# Patient Record
Sex: FEMALE | Race: WHITE | Employment: FULL TIME | URBAN - METROPOLITAN AREA
[De-identification: names, ages, dates, MRNs, and addresses within clinical notes are randomized per-mention and may not be internally consistent; named-entity substitution may affect disease eponyms.]

---

## 2020-12-29 ENCOUNTER — TRANSCRIBE ORDER (OUTPATIENT)
Dept: SCHEDULING | Age: 35
End: 2020-12-29

## 2020-12-29 DIAGNOSIS — R51.9 NONINTRACTABLE HEADACHE: ICD-10-CM

## 2020-12-29 DIAGNOSIS — R20.8 DYSESTHESIA: Primary | ICD-10-CM

## 2021-01-20 ENCOUNTER — HOSPITAL ENCOUNTER (OUTPATIENT)
Dept: MRI IMAGING | Age: 36
Discharge: HOME | End: 2021-01-20
Attending: FAMILY MEDICINE
Payer: COMMERCIAL

## 2021-01-20 DIAGNOSIS — R20.8 DYSESTHESIA: ICD-10-CM

## 2021-01-20 DIAGNOSIS — R51.9 NONINTRACTABLE HEADACHE: ICD-10-CM

## 2021-01-20 PROCEDURE — 70551 MRI BRAIN STEM W/O DYE: CPT

## 2022-11-12 ENCOUNTER — HOSPITAL ENCOUNTER (EMERGENCY)
Age: 37
Discharge: HOME | End: 2022-11-12
Attending: EMERGENCY MEDICINE
Payer: COMMERCIAL

## 2022-11-12 VITALS
OXYGEN SATURATION: 99 % | HEART RATE: 78 BPM | BODY MASS INDEX: 24.24 KG/M2 | RESPIRATION RATE: 16 BRPM | WEIGHT: 145.5 LBS | TEMPERATURE: 98.3 F | DIASTOLIC BLOOD PRESSURE: 71 MMHG | HEIGHT: 65 IN | SYSTOLIC BLOOD PRESSURE: 118 MMHG

## 2022-11-12 DIAGNOSIS — L03.113 CELLULITIS OF RIGHT UPPER EXTREMITY: ICD-10-CM

## 2022-11-12 DIAGNOSIS — I80.8 THROMBOPHLEBITIS ARM: Primary | ICD-10-CM

## 2022-11-12 PROCEDURE — 99283 EMERGENCY DEPT VISIT LOW MDM: CPT

## 2022-11-12 RX ORDER — CLINDAMYCIN HYDROCHLORIDE 300 MG/1
300 CAPSULE ORAL 3 TIMES DAILY
Qty: 21 CAPSULE | Refills: 0 | Status: SHIPPED | OUTPATIENT
Start: 2022-11-12 | End: 2022-11-19

## 2022-11-12 RX ORDER — CEPHALEXIN 500 MG/1
500 CAPSULE ORAL 3 TIMES DAILY
COMMUNITY
End: 2022-11-21

## 2022-11-12 RX ORDER — IBUPROFEN 800 MG/1
800 TABLET ORAL
COMMUNITY
End: 2022-11-30

## 2022-11-12 RX ORDER — MUPIROCIN 20 MG/G
OINTMENT TOPICAL DAILY
COMMUNITY

## 2022-11-12 NOTE — ED NOTES
The patient was discharged home by Dr Samuel Benítez in stable condition. The patient is alert and oriented, in no respiratory distress. The patient's diagnosis, condition and treatment were explained. The patient expressed understanding. 1 prescriptions given. A discharge plan has been developed. A  was not involved in the process. Aftercare instructions were given. Pt ambulatory out of the ED.

## 2022-11-12 NOTE — ED TRIAGE NOTES
Pt presents to ED with c/o skin infection on right mid forearm. Pt states she had an IV inserted for birth of her child on 10/26/22. Pt states removed 10/28/22 and has been painful since. Pt seen by her MD 11/9/2022 and started on Keflex. She states no better.

## 2022-11-16 NOTE — ED PROVIDER NOTES
27-year-old female with no significant past medical history presents to the emergency department stating that she had an IV inserted and her right forearm for the birth of her child on 10/26. She states that this IV was in place until 10/28 but states that during that entire time it was very painful particularly when medications or fluids were passed through it. She states that she requested that the IV be removed multiple times but it remained in place. She states that ever since it has been removed it was red and she states that she was started on Keflex by her primary care doctor on 11/9 which she has been taking for a few days without improvement in her symptoms. She states that she has noted a very small amount of white stuff around the wound but no samantha purulent drainage. She denies any fever, chills, or other systemic symptoms. She expresses concern for possible MRSA. Denies any distal numbness or weakness or significant swelling. Skin Problem        Past Medical History:   Diagnosis Date    Gastrointestinal disorder        Past Surgical History:   Procedure Laterality Date    HX HEENT           History reviewed. No pertinent family history.     Social History     Socioeconomic History    Marital status: SINGLE     Spouse name: Not on file    Number of children: Not on file    Years of education: Not on file    Highest education level: Not on file   Occupational History    Not on file   Tobacco Use    Smoking status: Former     Types: Cigarettes     Passive exposure: Never    Smokeless tobacco: Never   Substance and Sexual Activity    Alcohol use: Yes     Comment: occasional    Drug use: Not on file    Sexual activity: Not on file   Other Topics Concern    Not on file   Social History Narrative    Not on file     Social Determinants of Health     Financial Resource Strain: Not on file   Food Insecurity: Not on file   Transportation Needs: Not on file   Physical Activity: Not on file   Stress: Not on file   Social Connections: Not on file   Intimate Partner Violence: Not on file   Housing Stability: Not on file         ALLERGIES: Shellfish derived    Review of Systems   Constitutional:  Negative for activity change, appetite change, chills and fever. HENT:  Negative for congestion, rhinorrhea, sinus pressure, sneezing and sore throat. Eyes:  Negative for photophobia and visual disturbance. Respiratory:  Negative for cough and shortness of breath. Cardiovascular:  Negative for chest pain. Gastrointestinal:  Negative for abdominal pain, blood in stool, constipation, diarrhea, nausea and vomiting. Genitourinary:  Negative for difficulty urinating, dysuria, flank pain, frequency, hematuria, menstrual problem, urgency, vaginal bleeding and vaginal discharge. Musculoskeletal:  Negative for arthralgias, back pain, myalgias and neck pain. Skin:  Positive for rash and wound. Neurological:  Negative for syncope, weakness, numbness and headaches. Psychiatric/Behavioral:  Negative for self-injury and suicidal ideas. All other systems reviewed and are negative. Vitals:    11/12/22 0855   BP: 118/71   Pulse: 78   Resp: 16   Temp: 98.3 °F (36.8 °C)   SpO2: 99%   Weight: 66 kg (145 lb 8.1 oz)   Height: 5' 5\" (1.651 m)            Physical Exam  Vitals and nursing note reviewed. Constitutional:       General: She is not in acute distress. Appearance: Normal appearance. She is well-developed. She is not diaphoretic. Comments: Very pleasant, no acute distress. HENT:      Head: Normocephalic and atraumatic. Nose: Nose normal.   Eyes:      Extraocular Movements: Extraocular movements intact. Conjunctiva/sclera: Conjunctivae normal.      Pupils: Pupils are equal, round, and reactive to light. Cardiovascular:      Rate and Rhythm: Normal rate and regular rhythm. Heart sounds: Normal heart sounds.    Pulmonary:      Effort: Pulmonary effort is normal.      Breath sounds: Normal breath sounds. Abdominal:      General: There is no distension. Palpations: Abdomen is soft. Tenderness: There is no abdominal tenderness. Musculoskeletal:         General: No tenderness. Cervical back: Neck supple. Skin:     General: Skin is warm and dry. Comments: See attached clinical photo with circular erythematous area surrounding IV site suspected of thrombophlebitis and cellulitis. No fluctuant abscess. 2+ radial pulses distal with intact strength and sensation. Neurological:      General: No focal deficit present. Mental Status: She is alert and oriented to person, place, and time. Cranial Nerves: No cranial nerve deficit. Sensory: No sensory deficit. Motor: No weakness. Coordination: Coordination normal.            MDM    76-year-old female presents with cellulitis and thrombophlebitis after IV. She is afebrile with vital signs stable no acute distress. We will switch antibiotics to doxycycline for better MRSA coverage and recommended frequent warm compresses to the area allowing it to heal.  Recommended PCP follow-up for further evaluation and return precautions were given for worsening or concerns. This plan was discussed with the patient at the bedside and she stated both understanding and agreement. Please note that this dictation was completed with [employer], the [employer] voice recognition software. Quite often unanticipated grammatical, syntax, homophones, and other interpretive errors are inadvertently transcribed by the computer software. Please disregard these errors. Please excuse any errors that have escaped final proofreading.       Procedures

## 2022-11-21 ENCOUNTER — HOSPITAL ENCOUNTER (EMERGENCY)
Age: 37
Discharge: HOME | End: 2022-11-21
Attending: EMERGENCY MEDICINE
Payer: COMMERCIAL

## 2022-11-21 ENCOUNTER — HOSPITAL ENCOUNTER (OUTPATIENT)
Dept: INFUSION THERAPY | Age: 37
Discharge: HOME | End: 2022-11-21
Payer: COMMERCIAL

## 2022-11-21 ENCOUNTER — OFFICE VISIT (OUTPATIENT)
Dept: EMERGENCY DEPT | Age: 37
End: 2022-11-21
Attending: EMERGENCY MEDICINE
Payer: COMMERCIAL

## 2022-11-21 VITALS
SYSTOLIC BLOOD PRESSURE: 116 MMHG | BODY MASS INDEX: 23.99 KG/M2 | TEMPERATURE: 98.2 F | WEIGHT: 144 LBS | RESPIRATION RATE: 16 BRPM | HEIGHT: 65 IN | OXYGEN SATURATION: 96 % | DIASTOLIC BLOOD PRESSURE: 67 MMHG | HEART RATE: 93 BPM

## 2022-11-21 VITALS
BODY MASS INDEX: 24.62 KG/M2 | SYSTOLIC BLOOD PRESSURE: 111 MMHG | HEIGHT: 65 IN | TEMPERATURE: 98.4 F | HEART RATE: 100 BPM | OXYGEN SATURATION: 97 % | RESPIRATION RATE: 18 BRPM | WEIGHT: 147.8 LBS | DIASTOLIC BLOOD PRESSURE: 59 MMHG

## 2022-11-21 DIAGNOSIS — Z78.9 FAILURE OF OUTPATIENT TREATMENT: ICD-10-CM

## 2022-11-21 DIAGNOSIS — I80.8 SEPTIC THROMBOPHLEBITIS OF UPPER EXTREMITY: Primary | ICD-10-CM

## 2022-11-21 PROCEDURE — 96365 THER/PROPH/DIAG IV INF INIT: CPT

## 2022-11-21 PROCEDURE — 99284 EMERGENCY DEPT VISIT MOD MDM: CPT

## 2022-11-21 PROCEDURE — 74011000258 HC RX REV CODE- 258: Performed by: EMERGENCY MEDICINE

## 2022-11-21 PROCEDURE — 74011250636 HC RX REV CODE- 250/636: Performed by: EMERGENCY MEDICINE

## 2022-11-21 PROCEDURE — 99284 EMERGENCY DEPT VISIT MOD MDM: CPT | Performed by: EMERGENCY MEDICINE

## 2022-11-21 RX ADMIN — DALBAVANCIN 1500 MG: 500 INJECTION, POWDER, FOR SOLUTION INTRAVENOUS at 16:11

## 2022-11-21 NOTE — PROGRESS NOTES
[facility] Progress Note    Date: 2022    Name: Saintclair Meter    MRN: [id number]         : 1985    Ms. Will Arrived ambulatory and in no distress for Dalvance Infusion. Assessment was completed, no acute issues at this time, no new complaints voiced. 24 G PIV established to left arm, + blood return. Ms. Oz Walton vitals were reviewed. Visit Vitals  BP (!) 111/59 (BP 1 Location: Right upper arm, BP Patient Position: Sitting)   Pulse 100   Temp 98.4 °F (36.9 °C)   Resp 18   Ht 5' 5\" (1.651 m)   Wt 67 kg (147 lb 12.8 oz)   SpO2 97%   Breastfeeding Yes   BMI 24.60 kg/m²         Medications:  Medications Administered       dalbavancin (DALVANCE) 1,500 mg in dextrose 5% 250 mL, overfill volume 25 mL IVPB       Admin Date  2022 Action  New Bag Dose  1,500 mg Rate  700 mL/hr Route  IntraVENous Administered By  Melisa Nassar RN                      Ms. [facility] tolerated treatment well and was discharged from [place] [id number] in stable condition at 1650. PIV flushed & removed. Pt was advised to follow up with her physician regarding future appointments at the [facility].     Betsy Alvarez RN  2022

## 2022-11-21 NOTE — ED PROVIDER NOTES
The history is provided by the patient. Skin Problem   This is a recurrent problem. The current episode started more than 1 week ago. The problem has not changed since onset. Associated with: IV placed during L&D. There has been no fever. Affected Location: right forearm. The pain is mild. The pain has been Constant since onset. Associated symptoms include pain and weeping. Treatments tried: clindamycin, mupirocin. The treatment provided no relief. Past Medical History:   Diagnosis Date    Gastrointestinal disorder        Past Surgical History:   Procedure Laterality Date    HX HEENT           History reviewed. No pertinent family history. Social History     Socioeconomic History    Marital status: SINGLE     Spouse name: Not on file    Number of children: Not on file    Years of education: Not on file    Highest education level: Not on file   Occupational History    Not on file   Tobacco Use    Smoking status: Former     Types: Cigarettes     Passive exposure: Never    Smokeless tobacco: Never   Substance and Sexual Activity    Alcohol use: Yes     Comment: occasional    Drug use: Not on file    Sexual activity: Not on file   Other Topics Concern    Not on file   Social History Narrative    Not on file     Social Determinants of Health     Financial Resource Strain: Not on file   Food Insecurity: Not on file   Transportation Needs: Not on file   Physical Activity: Not on file   Stress: Not on file   Social Connections: Not on file   Intimate Partner Violence: Not on file   Housing Stability: Not on file         ALLERGIES: Shellfish derived    Review of Systems   All other systems reviewed and are negative. Vitals:    11/21/22 1050 11/21/22 1051   BP:  116/67   Pulse:  93   Resp:  16   Temp:  98.2 °F (36.8 °C)   SpO2:  94%   Weight: 65.3 kg (144 lb)    Height: 5' 5\" (1.651 m)             Physical Exam  Vitals and nursing note reviewed. Constitutional:       General: She is not in acute distress. Appearance: She is well-developed. HENT:      Head: Normocephalic and atraumatic. Eyes:      Conjunctiva/sclera: Conjunctivae normal.   Cardiovascular:      Rate and Rhythm: Normal rate and regular rhythm. Pulmonary:      Effort: Pulmonary effort is normal. No respiratory distress. Abdominal:      General: There is no distension. Musculoskeletal:         General: No deformity. Normal range of motion. Cervical back: Neck supple. Skin:     General: Skin is warm and dry. Comments: Right volar forearm with central ulceration/eschar and induration with surrounding erythema and warmth. 2 cm in diameter. Neurological:      Mental Status: She is alert. Cranial Nerves: No cranial nerve deficit. Psychiatric:         Behavior: Behavior normal.        MDM       40 y.o. female presents with ongoing infection of right volar forearm. Appears to be a septic thrombophlebitis from IV site and has not been responsive to topical or oral ABx as an outpatient. No systemic symptoms. Confirmed diagnosis by bedside ultrasound imaging. Discussed options for escalating therapy and she is a good candidate for dalvance infusion to avoid hospitalization. Set up for infusion later today. Will continue basic wound care. Plan to follow up with PCP as needed and return precautions discussed for worsening or new concerning symptoms.      Procedures

## 2022-11-21 NOTE — ED TRIAGE NOTES
Pt c/o right arm pain X 3 weeks, seen for the same here and PCP office, pt has been on clindamycin and cephalexin without improvement; pt had an IV at the site of the pain 3 weeks ago and states \" I told the nurses it was hurting and burning and all they did was put ice on it and continued to use it. \" PT has an area about the size of a dime with red around the site, black area in the midde of the site. Pt reprots fevers temp max 100.5 Saturday; denied n/v. PT reports taking tylenol for pain pain 9/10 at this time; pt is currently breastfeeding her .

## 2022-11-21 NOTE — PROGRESS NOTES
CARE MANAGEMENT NOTE      CM received consult for Mel. CM spoke with Prema Bush at [facility]. Appt scheduled for 3pm today.  Appt added to AVS. Rx scanned into chart.     _____________________________________  Deena Morrissey [address] [facility] Management   Available via [employer]  11/21/2022   11:51 AM

## 2022-11-27 ENCOUNTER — APPOINTMENT (OUTPATIENT)
Dept: GENERAL RADIOLOGY | Age: 37
DRG: 776 | End: 2022-11-27
Attending: EMERGENCY MEDICINE
Payer: COMMERCIAL

## 2022-11-27 ENCOUNTER — APPOINTMENT (OUTPATIENT)
Dept: CT IMAGING | Age: 37
DRG: 776 | End: 2022-11-27
Attending: EMERGENCY MEDICINE
Payer: COMMERCIAL

## 2022-11-27 ENCOUNTER — HOSPITAL ENCOUNTER (INPATIENT)
Age: 37
LOS: 3 days | Discharge: HOME | DRG: 776 | End: 2022-11-30
Attending: EMERGENCY MEDICINE | Admitting: INTERNAL MEDICINE
Payer: COMMERCIAL

## 2022-11-27 DIAGNOSIS — D72.825 BANDEMIA: ICD-10-CM

## 2022-11-27 DIAGNOSIS — D72.10 EOSINOPHILIA, UNSPECIFIED TYPE: ICD-10-CM

## 2022-11-27 DIAGNOSIS — I80.9 SEPTIC THROMBOPHLEBITIS: ICD-10-CM

## 2022-11-27 DIAGNOSIS — R50.9 FEVER OF UNKNOWN ORIGIN: Primary | ICD-10-CM

## 2022-11-27 LAB
ALBUMIN SERPL-MCNC: 2.8 G/DL (ref 3.5–5)
ALBUMIN/GLOB SERPL: 0.7 (ref 1.1–2.2)
ALP SERPL-CCNC: 65 U/L (ref 45–117)
ALT SERPL-CCNC: 21 U/L (ref 12–78)
ANION GAP SERPL CALC-SCNC: 9 MMOL/L (ref 5–15)
APPEARANCE UR: ABNORMAL
AST SERPL-CCNC: 21 U/L (ref 15–37)
BACTERIA URNS QL MICRO: NEGATIVE /HPF
BASOPHILS # BLD: 0.1 K/UL (ref 0–0.1)
BASOPHILS NFR BLD: 1 % (ref 0–1)
BILIRUB SERPL-MCNC: 0.4 MG/DL (ref 0.2–1)
BILIRUB UR QL: NEGATIVE
BUN SERPL-MCNC: 5 MG/DL (ref 6–20)
BUN/CREAT SERPL: 7 (ref 12–20)
CALCIUM SERPL-MCNC: 8.1 MG/DL (ref 8.5–10.1)
CHLORIDE SERPL-SCNC: 104 MMOL/L (ref 97–108)
CO2 SERPL-SCNC: 28 MMOL/L (ref 21–32)
COLOR UR: ABNORMAL
COVID-19 RAPID TEST, COVR: NOT DETECTED
CREAT SERPL-MCNC: 0.71 MG/DL (ref 0.55–1.02)
D DIMER PPP FEU-MCNC: 12.16 MG/L FEU (ref 0–0.65)
DIFFERENTIAL METHOD BLD: ABNORMAL
EOSINOPHIL # BLD: 1.2 K/UL (ref 0–0.4)
EOSINOPHIL NFR BLD: 11 % (ref 0–7)
EPITH CASTS URNS QL MICRO: ABNORMAL /LPF
ERYTHROCYTE [DISTWIDTH] IN BLOOD BY AUTOMATED COUNT: 13.8 % (ref 11.5–14.5)
FLUAV AG NPH QL IA: NEGATIVE
FLUBV AG NOSE QL IA: NEGATIVE
GLOBULIN SER CALC-MCNC: 4.1 G/DL (ref 2–4)
GLUCOSE SERPL-MCNC: 101 MG/DL (ref 65–100)
GLUCOSE UR STRIP.AUTO-MCNC: NEGATIVE MG/DL
HCT VFR BLD AUTO: 35 % (ref 35–47)
HGB BLD-MCNC: 11.6 G/DL (ref 11.5–16)
HGB UR QL STRIP: ABNORMAL
IMM GRANULOCYTES # BLD AUTO: 0 K/UL (ref 0–0.04)
IMM GRANULOCYTES NFR BLD AUTO: 0 % (ref 0–0.5)
KETONES UR QL STRIP.AUTO: NEGATIVE MG/DL
LACTATE BLD-SCNC: 0.85 MMOL/L (ref 0.4–2)
LEUKOCYTE ESTERASE UR QL STRIP.AUTO: ABNORMAL
LIPASE SERPL-CCNC: 65 U/L (ref 73–393)
LYMPHOCYTES # BLD: 2 K/UL (ref 0.8–3.5)
LYMPHOCYTES NFR BLD: 18 % (ref 12–49)
MAGNESIUM SERPL-MCNC: 1.6 MG/DL (ref 1.6–2.4)
MCH RBC QN AUTO: 31.9 PG (ref 26–34)
MCHC RBC AUTO-ENTMCNC: 33.1 G/DL (ref 30–36.5)
MCV RBC AUTO: 96.2 FL (ref 80–99)
MONOCYTES # BLD: 1.4 K/UL (ref 0–1)
MONOCYTES NFR BLD: 13 % (ref 5–13)
NEUTS SEG # BLD: 6.1 K/UL (ref 1.8–8)
NEUTS SEG NFR BLD: 56 % (ref 32–75)
NITRITE UR QL STRIP.AUTO: NEGATIVE
NRBC # BLD: 0 K/UL (ref 0–0.01)
NRBC BLD-RTO: 0 PER 100 WBC
PH UR STRIP: 6 (ref 5–8)
PLATELET # BLD AUTO: 398 K/UL (ref 150–400)
PMV BLD AUTO: 8.4 FL (ref 8.9–12.9)
POTASSIUM SERPL-SCNC: 3.5 MMOL/L (ref 3.5–5.1)
PROT SERPL-MCNC: 6.9 G/DL (ref 6.4–8.2)
PROT UR STRIP-MCNC: NEGATIVE MG/DL
RBC # BLD AUTO: 3.64 M/UL (ref 3.8–5.2)
RBC #/AREA URNS HPF: ABNORMAL /HPF (ref 0–5)
RBC MORPH BLD: ABNORMAL
SODIUM SERPL-SCNC: 141 MMOL/L (ref 136–145)
SOURCE, COVRS: NORMAL
SP GR UR REFRACTOMETRY: 1.01 (ref 1–1.03)
TROPONIN-HIGH SENSITIVITY: 4 NG/L (ref 0–51)
UR CULT HOLD, URHOLD: NORMAL
UROBILINOGEN UR QL STRIP.AUTO: 0.2 EU/DL (ref 0.2–1)
WBC # BLD AUTO: 10.8 K/UL (ref 3.6–11)
WBC URNS QL MICRO: ABNORMAL /HPF (ref 0–4)

## 2022-11-27 PROCEDURE — 85025 COMPLETE CBC W/AUTO DIFF WBC: CPT

## 2022-11-27 PROCEDURE — G0378 HOSPITAL OBSERVATION PER HR: HCPCS

## 2022-11-27 PROCEDURE — 65270000029 HC RM PRIVATE

## 2022-11-27 PROCEDURE — 74011250636 HC RX REV CODE- 250/636: Performed by: EMERGENCY MEDICINE

## 2022-11-27 PROCEDURE — 87040 BLOOD CULTURE FOR BACTERIA: CPT

## 2022-11-27 PROCEDURE — 85379 FIBRIN DEGRADATION QUANT: CPT

## 2022-11-27 PROCEDURE — 71275 CT ANGIOGRAPHY CHEST: CPT

## 2022-11-27 PROCEDURE — 81001 URINALYSIS AUTO W/SCOPE: CPT

## 2022-11-27 PROCEDURE — 83690 ASSAY OF LIPASE: CPT

## 2022-11-27 PROCEDURE — 36415 COLL VENOUS BLD VENIPUNCTURE: CPT

## 2022-11-27 PROCEDURE — 99285 EMERGENCY DEPT VISIT HI MDM: CPT

## 2022-11-27 PROCEDURE — 84484 ASSAY OF TROPONIN QUANT: CPT

## 2022-11-27 PROCEDURE — 83735 ASSAY OF MAGNESIUM: CPT

## 2022-11-27 PROCEDURE — 71045 X-RAY EXAM CHEST 1 VIEW: CPT

## 2022-11-27 PROCEDURE — 74011000636 HC RX REV CODE- 636: Performed by: EMERGENCY MEDICINE

## 2022-11-27 PROCEDURE — 83605 ASSAY OF LACTIC ACID: CPT

## 2022-11-27 PROCEDURE — 93005 ELECTROCARDIOGRAM TRACING: CPT

## 2022-11-27 PROCEDURE — 87804 INFLUENZA ASSAY W/OPTIC: CPT

## 2022-11-27 PROCEDURE — 80053 COMPREHEN METABOLIC PANEL: CPT

## 2022-11-27 PROCEDURE — 87635 SARS-COV-2 COVID-19 AMP PRB: CPT

## 2022-11-27 RX ORDER — ONDANSETRON 2 MG/ML
4 INJECTION INTRAMUSCULAR; INTRAVENOUS
Status: DISCONTINUED | OUTPATIENT
Start: 2022-11-27 | End: 2022-11-30 | Stop reason: HOSPADM

## 2022-11-27 RX ORDER — SODIUM CHLORIDE 0.9 % (FLUSH) 0.9 %
5-40 SYRINGE (ML) INJECTION EVERY 8 HOURS
Status: DISCONTINUED | OUTPATIENT
Start: 2022-11-27 | End: 2022-11-30 | Stop reason: HOSPADM

## 2022-11-27 RX ORDER — ACETAMINOPHEN 650 MG/1
650 SUPPOSITORY RECTAL
Status: DISCONTINUED | OUTPATIENT
Start: 2022-11-27 | End: 2022-11-30 | Stop reason: HOSPADM

## 2022-11-27 RX ORDER — OXYCODONE AND ACETAMINOPHEN 5; 325 MG/1; MG/1
1 TABLET ORAL
COMMUNITY
End: 2023-01-04

## 2022-11-27 RX ORDER — POLYETHYLENE GLYCOL 3350 17 G/17G
17 POWDER, FOR SOLUTION ORAL DAILY PRN
Status: DISCONTINUED | OUTPATIENT
Start: 2022-11-27 | End: 2022-11-30 | Stop reason: HOSPADM

## 2022-11-27 RX ORDER — ACETAMINOPHEN 325 MG/1
650 TABLET ORAL
Status: DISCONTINUED | OUTPATIENT
Start: 2022-11-27 | End: 2022-11-30 | Stop reason: HOSPADM

## 2022-11-27 RX ORDER — CHOLECALCIFEROL (VITAMIN D3) 50 MCG
CAPSULE ORAL
COMMUNITY

## 2022-11-27 RX ORDER — SODIUM CHLORIDE 0.9 % (FLUSH) 0.9 %
5-40 SYRINGE (ML) INJECTION AS NEEDED
Status: DISCONTINUED | OUTPATIENT
Start: 2022-11-27 | End: 2022-11-30 | Stop reason: HOSPADM

## 2022-11-27 RX ORDER — ACETAMINOPHEN 500 MG
500 TABLET ORAL
COMMUNITY
End: 2023-01-04

## 2022-11-27 RX ORDER — CALCIUM CARBONATE 200(500)MG
2 TABLET,CHEWABLE ORAL DAILY
COMMUNITY

## 2022-11-27 RX ORDER — ONDANSETRON 4 MG/1
4 TABLET, ORALLY DISINTEGRATING ORAL
Status: DISCONTINUED | OUTPATIENT
Start: 2022-11-27 | End: 2022-11-30 | Stop reason: HOSPADM

## 2022-11-27 RX ADMIN — SODIUM CHLORIDE 1000 ML: 9 INJECTION, SOLUTION INTRAVENOUS at 14:46

## 2022-11-27 RX ADMIN — IOPAMIDOL 100 ML: 755 INJECTION, SOLUTION INTRAVENOUS at 16:01

## 2022-11-27 NOTE — ED TRIAGE NOTES
Pt ambulates to treatment area she states that on 10/26/22 she had an IV placed in her right forearm when she was in labor. On 10/28/22 it was removed on her request because it continued to be painful. She has continued with a sore that is oozing and fevers. She states that yesterday and this morning she felt some chest tightness with shooting pains in her joints along with continued fevers and nausea.

## 2022-11-27 NOTE — ED PROVIDER NOTES
44-year-old female with PMHx of possible septic thrombophlebitis and ulcerative colitis status post normal spontaneous vaginal delivery 4 weeks ago presents to the emergency department with fever since yesterday. During her stay for her delivery, patient's IV infiltrated and ultimately caused a thrombophlebitis, for which she has been on multiple rounds of antibiotics, including cephalexin, clindamycin, and 1 round of Dalvance 1 week ago. For the last few days, patient has experienced joint pain and fever to 101.0. She also reports that she is currently in a UC flare and is having her typical abdominal pain and diarrhea associated with this condition. The history is provided by the patient. Fever   This is a recurrent problem. The current episode started more than 1 week ago. The problem occurs daily. The problem has not changed since onset. The maximum temperature noted was 101 - 101.9 F. Associated symptoms include diarrhea and muscle aches. She has tried antibiotics for the symptoms. Past Medical History:   Diagnosis Date    Gastrointestinal disorder     Septic thrombophlebitis        Past Surgical History:   Procedure Laterality Date    HX HEENT           History reviewed. No pertinent family history.     Social History     Socioeconomic History    Marital status: SINGLE     Spouse name: Not on file    Number of children: Not on file    Years of education: Not on file    Highest education level: Not on file   Occupational History    Not on file   Tobacco Use    Smoking status: Former     Types: Cigarettes     Passive exposure: Never    Smokeless tobacco: Never   Substance and Sexual Activity    Alcohol use: Not Currently     Comment: occasional    Drug use: Not on file    Sexual activity: Not on file   Other Topics Concern    Not on file   Social History Narrative    Not on file     Social Determinants of Health     Financial Resource Strain: Not on file   Food Insecurity: Not on file   Transportation Needs: Not on file   Physical Activity: Not on file   Stress: Not on file   Social Connections: Not on file   Intimate Partner Violence: Not on file   Housing Stability: Not on file         ALLERGIES: Shellfish derived    Review of Systems   Constitutional:  Positive for fever. HENT: Negative. Eyes: Negative. Respiratory: Negative. Cardiovascular: Negative. Gastrointestinal:  Positive for diarrhea. Endocrine: Negative. Genitourinary: Negative. Musculoskeletal:  Positive for arthralgias and myalgias. Skin:  Positive for wound. Neurological: Negative. Hematological: Negative. Psychiatric/Behavioral: Negative. Vitals:    11/27/22 1316   BP: 102/70   Pulse: (!) 109   Resp: 16   Temp: 98.6 °F (37 °C)   SpO2: 95%   Weight: 66.2 kg (145 lb 15.1 oz)   Height: 5' 5\" (1.651 m)            Physical Exam  Vitals and nursing note reviewed. Constitutional:       General: She is not in acute distress. Appearance: Normal appearance. She is not ill-appearing. HENT:      Head: Normocephalic and atraumatic. Nose: Nose normal.      Mouth/Throat:      Mouth: Mucous membranes are moist.   Eyes:      Extraocular Movements: Extraocular movements intact. Pupils: Pupils are equal, round, and reactive to light. Cardiovascular:      Rate and Rhythm: Normal rate and regular rhythm. Pulses: Normal pulses. Pulmonary:      Effort: Pulmonary effort is normal. No respiratory distress. Breath sounds: Normal breath sounds. Abdominal:      General: There is no distension. Palpations: Abdomen is soft. Tenderness: There is no abdominal tenderness. Musculoskeletal:         General: Normal range of motion. Cervical back: Normal range of motion and neck supple. Skin:     General: Skin is warm and dry. Findings: Lesion (Right forearm with well-healing scab at the site of her IV, no adjacent erythema) present.    Neurological:      General: No focal deficit present. Mental Status: She is alert and oriented to person, place, and time. Psychiatric:         Mood and Affect: Mood normal.        MDM  Number of Diagnoses or Management Options  Fever of unknown origin  Diagnosis management comments: DDx: Occult infection, pneumonia, UTI, sepsis, COVID, influenza, viral URI, endocarditis    Plan:  - Labs: CBC, CMP, troponin, D-dimer, cultures of urine and blood  - Imaging: Chest x-ray    Reassessment: D-dimer elevated, will pursue CTA chest. Remainder of workup is reassuring. Reassessment: Pt's CTA is negative. No obvious source of fever. Wound on arm improved based on photos from previous. No UTI, normal CXR, Covid/ flu swabs negative. Will admit to hospitalist for workup of fever of unknown origin. Cultures drawn. Pt remains overall well-appearing. Perfect Serve Consult for Admission  5:32 PM    ED Room Number: WER03/03  Patient Name and age:  Hanna Conrad 40 y.o.  female  Working Diagnosis: Fever of unknown origin    COVID-19 Suspicion:  no  Sepsis present:  no  Reassessment needed: yes  Code Status:  Full Code  Readmission: no  Isolation Requirements:  no  Recommended Level of Care:  med/surg  Department: [facility] ED - [phone]  Admitting Provider: Fever of unknown origin    Other:  59-year-old female with PMHx of possible septic thrombophlebitis and ulcerative colitis status post normal spontaneous vaginal delivery 4 weeks ago presents to the emergency department with intermittent fever x 1 month. During her stay for her delivery, patient's IV infiltrated and ultimately caused a thrombophlebitis, for which she has been on multiple rounds of antibiotics, including cephalexin, clindamycin, and 1 round of Dalvance 1 week ago. For the last few days, patient has experienced joint pain and fever to 101.0. Mildly tachycardic here.   She also reports that she is currently in a UC flare and is having her typical abdominal pain and diarrhea associated with this condition. Would like to bring her in for occult infection workup, including endocarditis. Amount and/or Complexity of Data Reviewed  Clinical lab tests: reviewed  Tests in the radiology section of CPT®: reviewed  Tests in the medicine section of CPT®: reviewed  Independent visualization of images, tracings, or specimens: yes    Risk of Complications, Morbidity, and/or Mortality  Presenting problems: moderate  Diagnostic procedures: low  Management options: low    Patient Progress  Patient progress: improved    ED Course as of 11/27/22 1551   Sun Nov 27, 2022   1546 This EKG was interpreted by me at 1448. Sinus tachycardia at 101 bpm.  Normal axis.   No significant ST segment abnormalities [JT]      ED Course User Index  [JT] Myrtice Dakins, MD       Procedures

## 2022-11-28 ENCOUNTER — APPOINTMENT (OUTPATIENT)
Dept: CT IMAGING | Age: 37
DRG: 776 | End: 2022-11-28
Attending: INTERNAL MEDICINE
Payer: COMMERCIAL

## 2022-11-28 LAB
ATRIAL RATE: 101 BPM
C DIFF GDH STL QL: NEGATIVE
C DIFF TOX A+B STL QL IA: NEGATIVE
CALCULATED P AXIS, ECG09: 42 DEGREES
CALCULATED R AXIS, ECG10: 41 DEGREES
CALCULATED T AXIS, ECG11: 4 DEGREES
CAMPYLOBACTER SPECIES, DNA: NEGATIVE
DIAGNOSIS, 93000: NORMAL
ENTEROTOXIGEN E COLI, DNA: NEGATIVE
INTERPRETATION: NORMAL
P SHIGELLOIDES DNA STL QL NAA+PROBE: NEGATIVE
P-R INTERVAL, ECG05: 134 MS
Q-T INTERVAL, ECG07: 354 MS
QRS DURATION, ECG06: 78 MS
QTC CALCULATION (BEZET), ECG08: 459 MS
SALMONELLA SPECIES, DNA: NEGATIVE
SHIGA TOXIN PRODUCING, DNA: NEGATIVE
SHIGELLA SP+EIEC IPAH STL QL NAA+PROBE: NEGATIVE
VENTRICULAR RATE, ECG03: 101 BPM
VIBRIO SPECIES, DNA: NEGATIVE
Y. ENTEROCOLITICA, DNA: NEGATIVE

## 2022-11-28 PROCEDURE — 74011250637 HC RX REV CODE- 250/637: Performed by: INTERNAL MEDICINE

## 2022-11-28 PROCEDURE — 89055 LEUKOCYTE ASSESSMENT FECAL: CPT

## 2022-11-28 PROCEDURE — 87506 IADNA-DNA/RNA PROBE TQ 6-11: CPT

## 2022-11-28 PROCEDURE — 74011000250 HC RX REV CODE- 250: Performed by: INTERNAL MEDICINE

## 2022-11-28 PROCEDURE — 65270000029 HC RM PRIVATE

## 2022-11-28 PROCEDURE — G0378 HOSPITAL OBSERVATION PER HR: HCPCS

## 2022-11-28 PROCEDURE — 74176 CT ABD & PELVIS W/O CONTRAST: CPT

## 2022-11-28 PROCEDURE — 87324 CLOSTRIDIUM AG IA: CPT

## 2022-11-28 RX ADMIN — SODIUM CHLORIDE, PRESERVATIVE FREE 10 ML: 5 INJECTION INTRAVENOUS at 06:54

## 2022-11-28 RX ADMIN — ACETAMINOPHEN 650 MG: 325 TABLET ORAL at 06:52

## 2022-11-28 RX ADMIN — SODIUM CHLORIDE, PRESERVATIVE FREE 10 ML: 5 INJECTION INTRAVENOUS at 21:18

## 2022-11-28 RX ADMIN — ACETAMINOPHEN 650 MG: 325 TABLET ORAL at 00:07

## 2022-11-28 NOTE — PROGRESS NOTES
2022  11:20 AM  CM completed assessment w/ pt via phone, charted demographics verified, pt lives w/ spouse Karla Copeland and their children in a private residence, at baseline pt is ambulatory, independent w/ all ADLs, drives, no fall history. Pt has  infant delivered 10/28. Reason for Admission:  Emergent for Fever of Unknown Origin  Pt is a 41 yo female who presents to [facility] c/o recurrent fevers, pt had vaginal delivery 10/28 w/ infiltrated IV has been treated w/ ABx and IV Dalvance  PMHx:  Gastrointestinal disorder      Septic thrombophlebitis                        RUR Score:    8% Low Risk of Readmission/green                   Plan for utilizing home health:   No history of HH or Rehab, pt is independent at baseline for her ADLS     DME: none  Rx: [employer], pt uses [address] and is normally covered for her medications  COVID Vax Hx; 2 jabs in , no boosters    PCP: First and Last name:  Dave Moy MD     Name of Practice:    Are you a current patient: Yes/No: yes    Approximate date of last visit: 30 days    Can you participate in a virtual visit with your PCP: yes                     Current Advanced Directive/Advance Care Plan: Full Code  HCDM/NOK spouse Rand Pulse [phone]    Healthcare Decision Maker:   Click here to complete [address] including selection of the Healthcare Decision Maker Relationship (ie \"Primary\")                             Transition of Care Plan:       RUR 8 % low Risk of Readmission/Green  LOS 1 Day  Hospital admission for medical management  ID consult  GI consult  CM to follow through for treatment/response   DC when stable to home w/ family assistance   Outpatient follow up PCP, specialists  Family will transport at [place] [id number] Management Interventions  PCP Verified by CM:  Yes Vik Candelaria MD )  Palliative Care Criteria Met (RRAT>21 & CHF Dx)?: No  Mode of Transport at Discharge: Self (Family)  Physical Therapy Consult: No  Occupational Therapy Consult: No  Speech Therapy Consult: No  Support Systems: Spouse/Significant Other, Parent(s) (pt lives w/ spouse and their children in pvt residence, at baseline pt is ambulatory, iADLs, drives , family can assist )  Confirm Follow Up Transport: Family  Discharge Location  Patient Expects to be Discharged to[de-identified] Home with family assistance  DAVID Scanlon

## 2022-11-28 NOTE — PROGRESS NOTES
RRT note-s/w primary nurse regarding pt condition. Pt initally with mews of 5 on arrival to [facility], noted to have fever and was given tylenol PTA to floor. Per primary nurse fever has decreased along with updated mews score from 5 to 2. Will continue to monitor, no RRT interventions at this time.

## 2022-11-28 NOTE — ED NOTES
Perfect Serve Consult for Admission  10:08 PM    ED Room Number: WER03/03  Patient Name and age:  Senthil Romero 40 y.o.  female  Working Diagnosis:   1. Fever of unknown origin        COVID-19 Suspicion:  no  Sepsis present:  no  Reassessment needed: Other  Code Status:  Full Code  Readmission: no  Isolation Requirements:  no  Recommended Level of Care:  med/surg  Department: [facility] ED  Other: I assumed this patient was already admitted. Dr. Andrew Ellison apparently reached out to 1 of you guys earlier. She has had a fever for a week. She was diagnosed with cellulitis of her right arm and treated with Dalvance earlier in the week. She complains of daily fevers, fatigue, joint pain. He felt like she needed admission to see ID and to rule out endocarditis.

## 2022-11-28 NOTE — PROGRESS NOTES
Jairo Fermin [address] [place] [address]  [address]  [phone]      Hospitalist Progress Note      NAME: Chandrakant Sanchez   :  1985  MRM:  [id number]    Date of service: 2022  7:01 AM       Assessment and Plan:, stool studies   Fever of unknown origin: on and off for few weeks. CTA of the chest is unremarkable for infection. UA is OK. Check stool studies as pt was on multiple ABx for phlebitis. Covid and flu test negative. Check BC. Consult ID     2. Ulcerative Colitis/ bloody diarrhea: check CT of the abdomen. Consult GI      3.  3 mm nodule superior segment right lower lobe. Out pt CT FU in a year. Discussed with PT          Subjective:     Chief Complaint[de-identified] Patient was seen and examined as a follow up for fever. Chart was reviewed. c/o abdominal pain and diarrhea     ROS:  (bold if positive, if negative)    Tolerating PT  Tolerating Diet        Objective:     Last 24hrs VS reviewed since prior progress note.  Most recent are:    Visit Vitals  /66   Pulse (!) 102   Temp 100.3 °F (37.9 °C)   Resp 18   Ht 5' 5\" (1.651 m)   Wt 66.2 kg (145 lb 15.1 oz)   SpO2 94%   BMI 24.29 kg/m²     SpO2 Readings from Last 6 Encounters:   22 94%   22 97%   22 96%   22 99%          Intake/Output Summary (Last 24 hours) at 2022 0701  Last data filed at 2022 1600  Gross per 24 hour   Intake 1000 ml   Output --   Net 1000 ml        Physical Exam:    Gen:  Well-developed, well-nourished, in no acute distress  HEENT:  Pink conjunctivae, PERRL, hearing intact to voice, moist mucous membranes  Neck:  Supple, without masses, thyroid non-tender  Resp:  No accessory muscle use, clear breath sounds without wheezes rales or rhonchi  Card:  No murmurs, normal S1, S2 without thrills, bruits or peripheral edema  Abd:  Soft, non-tender, non-distended, normoactive bowel sounds are present, no palpable organomegaly and no detectable hernias  Lymph:  No cervical or inguinal adenopathy  Musc:  No cyanosis or clubbing  Skin:  No rashes or ulcers, skin turgor is good  Neuro:  Cranial nerves are grossly intact, no focal motor weakness, follows commands appropriately  Psych:  Good insight, oriented to person, place and time, alert  __________________________________________________________________  Medications Reviewed: (see below)  Medications:     Current Facility-Administered Medications   Medication Dose Route Frequency    sodium chloride (NS) flush 5-40 mL  5-40 mL IntraVENous Q8H    sodium chloride (NS) flush 5-40 mL  5-40 mL IntraVENous PRN    acetaminophen (TYLENOL) tablet 650 mg  650 mg Oral Q6H PRN    Or    acetaminophen (TYLENOL) suppository 650 mg  650 mg Rectal Q6H PRN    polyethylene glycol (MIRALAX) packet 17 g  17 g Oral DAILY PRN    ondansetron (ZOFRAN ODT) tablet 4 mg  4 mg Oral Q8H PRN    Or    ondansetron (ZOFRAN) injection 4 mg  4 mg IntraVENous Q6H PRN        Lab Data Reviewed: (see below)  Lab Review:     Recent Labs     11/27/22  1424   WBC 10.8   HGB 11.6   HCT 35.0        Recent Labs     11/27/22  1424      K 3.5      CO2 28   *   BUN 5*   CREA 0.71   CA 8.1*   MG 1.6   ALB 2.8*   TBILI 0.4   ALT 21     No results found for: GLUCPOC  No results for input(s): PH, PCO2, PO2, HCO3, FIO2 in the last 72 hours. No results for input(s): INR, INREXT in the last 72 hours.   All Micro Results       Procedure Component Value Units Date/Time    CULTURE, BLOOD [[id number]] Collected: 11/27/22 1424    Order Status: Completed Specimen: Blood Updated: 11/28/22 0527     Special Requests: NO SPECIAL REQUESTS        Culture result: NO GROWTH AFTER 11 HOURS       CULTURE, BLOOD [[id number]] Collected: 11/27/22 1425    Order Status: Completed Specimen: Blood Updated: 11/28/22 0527     Special Requests: NO SPECIAL REQUESTS        Culture result: NO GROWTH AFTER 11 HOURS       COVID-19 RAPID TEST [[id number]] Collected: 11/27/22 1424    Order Status: Completed Specimen: Nasopharyngeal Updated: 11/27/22 1509     Specimen source Nasopharyngeal        COVID-19 rapid test Not detected        Comment: Rapid Abbott ID Now       Rapid NAAT:  The specimen is NEGATIVE for SARS-CoV-2, the novel coronavirus associated with COVID-19. Negative results should be treated as presumptive and, if inconsistent with clinical signs and symptoms or necessary for patient management, should be tested with an alternative molecular assay. Negative results do not preclude SARS-CoV-2 infection and should not be used as the sole basis for patient management decisions. This test has been authorized by the FDA under an Emergency Use Authorization (EUA) for use by authorized laboratories. Fact sheet for Healthcare Providers:  http://www.ghassan.sg/  Fact sheet for Patients: http://www.kelli-trang.biluz/       Methodology: Isothermal Nucleic Acid Amplification         URINE CULTURE HOLD SAMPLE [[id number]] Collected: 11/27/22 1424    Order Status: Completed Specimen: Urine from Serum Updated: 11/27/22 1442     Urine culture hold       Urine on hold in Microbiology dept for 2 days. If unpreserved urine is submitted, it cannot be used for addtional testing after 24 hours, recollection will be required. I have reviewed notes of prior 24hr. Other pertinent lab: Total time spent with patient: 35 minutes I personally reviewed chart, notes, data and current medications in the medical record. I have personally examined and treated the patient at bedside during this period.                  Care Plan discussed with: Patient, Nursing Staff, and >50% of time spent in counseling and coordination of care    Discussed:  Care Plan    Prophylaxis:  SCD's    Disposition:  Home w/Family           ___________________________________________________    Attending Physician: Merly Suresh MD

## 2022-11-28 NOTE — ED NOTES
TRANSFER - OUT REPORT:    Verbal report given to ROSEY on Marley Lambert  being transferred to [facility] ED for routine progression of care       Report consisted of patients Situation, Background, Assessment and   Recommendations(SBAR). Information from the following report(s) SBAR, ED Summary, Procedure Summary, and Recent Results was reviewed with the receiving nurse. Lines:   Peripheral IV 11/27/22 Left Antecubital (Active)   Site Assessment Clean, dry, & intact 11/27/22 1445   Phlebitis Assessment 0 11/27/22 1445   Infiltration Assessment 0 11/27/22 1445   Dressing Status Clean, dry, & intact 11/27/22 1445   Dressing Type Transparent 11/27/22 1445   Hub Color/Line Status Pink 11/27/22 1445        Opportunity for questions and clarification was provided.       Patient transported with: ROSEY

## 2022-11-28 NOTE — ED NOTES
TRANSFER - OUT REPORT:    Verbal report given to [facility] RN on Chip Dirk  being transferred to [facility] ED for routine progression of care       Report consisted of patients Situation, Background, Assessment and   Recommendations(SBAR). Information from the following report(s) SBAR, ED Summary, Procedure Summary, MAR, and Recent Results was reviewed with the receiving nurse. Lines:   Peripheral IV 11/27/22 Left Antecubital (Active)   Site Assessment Clean, dry, & intact 11/27/22 1445   Phlebitis Assessment 0 11/27/22 1445   Infiltration Assessment 0 11/27/22 1445   Dressing Status Clean, dry, & intact 11/27/22 1445   Dressing Type Transparent 11/27/22 1445   Hub Color/Line Status Pink 11/27/22 1445        Opportunity for questions and clarification was provided.       Patient transported with:   ROSEY

## 2022-11-28 NOTE — ED NOTES
Patient arrived via [facility] from [facility] ED    Patient to be assigned to 5th floor, room 504, RN will call report

## 2022-11-28 NOTE — CONSULTS
Yusef Pandya, NP-C  [phone] cell     Gastroenterology Consultation Note      Admit Date: 11/27/2022  Consult Date: 11/28/2022   I greatly appreciate your asking me to see Saintclair Meter, thank you very much for the opportunity to participate in her care. Narrative Assessment and Plan   GI consultation for rectal bleeding, diarrhea, abdominal pain, and pancolitis on CT. 67-year-old female with history of ulcerative proctitis first diagnosed in 2012 but progressing to left-sided UC by the time of her last office visit in 2018. At that time she was taking mesalamine twice a day as well as some herbal products but has not consistently taken any medication since 2019. Her last colonoscopy was in 2018 with UC from rectum to descending colon. She reports having taken mesalamine's and azathioprine in the past.  She reports having diarrhea, rectal bleeding for about a month and a half and developed a fever while she was in labor which has persisted. She has been on multiple rounds of antibiotics. She states her UC flared up during her first trimester and continued to be symptomatic for most of her pregnancy. Stool studies are pending. WBC 10.8. Check CRP and follow for stool studies. Hesitant to start steroids with fever and unknown source of infection. Could consider starting mesalamine but would await stool studies. Of note, she is breastfeeding. Needs a maintenance medication for her UC but I believe she is wanting to use herbal products and not sure she would be receptive to a maintenance medication. This can be discussed in an OP visit. Subjective:     Chief Complaint: UC, diarrhea, abdominal pain, rectal bleeding    History of Present Illness: GI consultation for rectal bleeding, diarrhea, abdominal pain, and CT finding of pancolitis. 67-year-old female with history of ulcerative proctitis which was first diagnosed in 2012.   She was last seen in our office in 2018 and at that time was taking mesalamine twice daily as well as some herbal products. Her last colonoscopy was 10/2018 by Dr. Dionisio Bowen with UC from rectum to descending colon. She reports having taken mesalamine's and azathioprine in the past but that they were not effective and that herbal products were more effective for resolving her symptoms. No consistent treatment of any kind since around 2019. She is a little over a month postpartum and was admitted with fever of unknown origin, stating that most of her problem started with an infiltrated IV. She has been on antibiotics since that time. She reports having rectal bleeding and diarrhea for about a month and a half. Stool studies are pending. She states that her UC flared up really bad during the first trimester of her pregnancy and she has had frequent flares for most of this year. There is family history of Crohn's disease and a maternal aunt and 2 maternal cousins. Labs: WBC 10.8, Hgb 11.6, HCT 35, MCV 96.2, platelets 745, albumin 2.8, sodium 141, potassium 3.5, BUN 5, creatinine 0.71, TB 0.4, AP 65, AST 21, ALT 21, D-dimer 12.16. PCP:  Jenny Joya MD    Past Medical History:   Diagnosis Date    Gastrointestinal disorder     Septic thrombophlebitis         Past Surgical History:   Procedure Laterality Date    HX HEENT         Social History     Tobacco Use    Smoking status: Former     Types: Cigarettes     Passive exposure: Never    Smokeless tobacco: Never   Substance Use Topics    Alcohol use: Not Currently     Comment: occasional        History reviewed. No pertinent family history. Allergies   Allergen Reactions    Shellfish Derived Angioedema            Home Medications:  Prior to Admission Medications   Prescriptions Last Dose Informant Patient Reported? Taking? B.animalis,bifid,infantis,long (Probiotic 4X) 10-15 mg TbEC 11/27/2022 at 0800  Yes Yes   Sig: Take  by mouth.    PNV Comb #2-Iron-FA-Omega 3 29-1-400 mg cmpk 11/27/2022 at 0800  Yes Yes   Sig: Take  by mouth. acetaminophen (TYLENOL) 500 mg tablet 11/26/2022 at 1845  Yes Yes   Sig: Take 500 mg by mouth every six (6) hours as needed for Pain. calcium carbonate (TUMS) 200 mg calcium (500 mg) chew 11/27/2022 at 0230  Yes Yes   Sig: Take 2 Tablets by mouth daily. ibuprofen (MOTRIN) 800 mg tablet Not Taking  Yes No   Sig: Take 800 mg by mouth. Patient not taking: Reported on 11/27/2022   mupirocin (BACTROBAN) 2 % ointment 11/26/2022  Yes Yes   Sig: Apply  to affected area daily. oxyCODONE-acetaminophen (Percocet) 5-325 mg per tablet 11/26/2022 at 1845  Yes Yes   Sig: Take 1 Tablet by mouth every four (4) hours as needed for Pain. Facility-Administered Medications: None       Hospital Medications:  Current Facility-Administered Medications   Medication Dose Route Frequency    sodium chloride (NS) flush 5-40 mL  5-40 mL IntraVENous Q8H    sodium chloride (NS) flush 5-40 mL  5-40 mL IntraVENous PRN    acetaminophen (TYLENOL) tablet 650 mg  650 mg Oral Q6H PRN    Or    acetaminophen (TYLENOL) suppository 650 mg  650 mg Rectal Q6H PRN    polyethylene glycol (MIRALAX) packet 17 g  17 g Oral DAILY PRN    ondansetron (ZOFRAN ODT) tablet 4 mg  4 mg Oral Q8H PRN    Or    ondansetron (ZOFRAN) injection 4 mg  4 mg IntraVENous Q6H PRN       Review of Systems: Per HPI, otherwise negative.       Objective:     Physical Exam:  Visit Vitals  /65 (BP 1 Location: Right upper arm, BP Patient Position: At rest)   Pulse 94   Temp 98.8 °F (37.1 °C)   Resp 15   Ht 5' 5\" (1.651 m)   Wt 66.2 kg (145 lb 15.1 oz)   SpO2 92%   BMI 24.29 kg/m²     SpO2 Readings from Last 6 Encounters:   11/28/22 92%   11/21/22 97%   11/21/22 96%   11/12/22 99%          Intake/Output Summary (Last 24 hours) at 11/28/2022 1507  Last data filed at 11/28/2022 1413  Gross per 24 hour   Intake 1840 ml   Output --   Net 1840 ml      General: no distress, comfortable  HEENT: Pupils equal, sclera anicteric, oropharynx with no gross lesions  Cardiovascular: No abnormal audible heart sounds, well perfused, no edema  Respiratory:  No abnormal audible breath sounds, normal respiratory effort, no throacic deformity  GI:  Abdomen nondistended, mild generalized TTP, no mass, no free fluid, no rebound or guarding. Musculoskeletal:  No skeletal deformity nor acute arthritis noted. Neurological:  Motor and sensory function intact in upper extremeties  Psychiatric:  Normal affect, memory intact, appears to have insight into current illness    Laboratory:    No results found for this or any previous visit (from the past 24 hour(s)). Assessment/Plan:     Active Problems:    Fever (11/27/2022)         See above narrative for full detail. Jaja Nava, RE    I have personally interviewed and examined Ms. Adi De Jesus.  I agree with history as outlined above. I have discussed with her in detail medications usable for ulcerative colitis, potential short and long-term side effects; have specifically advised that uncontrolled ulcerative colitis over a long time has a significant colon cancer risk and this is a primary reason why treatment is recommended to hopefully achieve remission. The second reason is to avoid the deleterious health effects of chronic bloody diarrhea. Specifically also advised the third reason which could pertain to any additional future pregnancies she might have is to reduce her risk of an adverse outcome of pregnancy. She informs me that she is not willing to use mesalamine while breast-feeding, is agreeable to corticosteroids. Will obtain QuantiFERON, thio purine methyltransferase level. Examining the area of her recently placed, infected intravenous site there does not appear to be much residual cellulitis; does not appear to be fasciitis. I believe we can then begin using 20 mg prednisone per day assuming you agree. Please begin if you are agreeable.     I have interviewed and examined patient with addendum to note above and formulation care plan to reflect my evaluation    Marty Stanley M.D.

## 2022-11-28 NOTE — PROGRESS NOTES
Bedside and Verbal shift change report given to JACK Perez (oncoming nurse) by Leander Hargrove RN (offgoing nurse). Report included the following information SBAR, Kardex, ED Summary, Intake/Output, MAR, and Recent Results.

## 2022-11-28 NOTE — H&P
Hospitalist Admission Note    NAME:  Humberto Ramos   :  1985   MRN:  [id number]     Date/Time:  2022 2:03 AM    Patient PCP: Aidan Romero MD  ________________________________________________________________________    Given the patient's current clinical presentation, I have a high level of concern for decompensation if discharged from the emergency department. Complex decision making was performed, which includes reviewing the patient's available past medical records, laboratory results, and x-ray films. My assessment of this patient's clinical condition and my plan of care is as follows. Assessment / Plan:    Fever of unknown origin:    The patient comes in w/ recurrent fevers    VS - Tmax 101.6F,   WBC normal, Hg 11.6  BUN 5, Cr 0.71  UA mod LE  Chest CTA - no PE  CXR - no acute disease    Admit and cont to monitor. She continues to have recurrent fevers w/ concerns of adverse effect to IV Dalvance vs uncontrolled UC w/ polyarthralgia and bacteremia w/ right forearm wound  Hold off on ABX for now  F/U Blood Cx  Obtain Echo  Consult ID    2. Ulcerative Colitis:    Consult GI to establish w/ patient and get her UC under control      Body mass index is 24.29 kg/m².:  18.5 - 24.9:  Normal weight      I have personally reviewed the radiographs, laboratory data in Epic and decisions and statements above are based partially on this personal interpretation. Code Status: Full Code     Prophylaxis:  SCD's     Subjective:   CHIEF COMPLAINT: Fever    HISTORY OF PRESENT ILLNESS:       The patient is a 39 y/o C F w/ PMH ulcerative colitis who comes in w/ recurrent fevers for the past two weeks. She recently had spontaneous vaginal delivery on Oct 28th. At that time she had an IV which infiltrated which was causing severe pain. On discharge she had increasing erythema, swelling and pain. This continued to persist and it has been treated w/ Keflex and Clindamycin.   She was then treated w/ IV Dalvance on 11/21. Since that time she has had recurrent fevers. On ROS she also reports of substernal chest pain which is described as a sharp/stabbing intermittent pain. It only lasts for a few seconds and radiates to her stomach. She has no palpitations, coughing, wheezing or dyspnea. On ROS she continues to have bloody diarrhea w/ hx of UC and is not on medications and has not followed up w/ gastroenterology. Past Medical History:   Diagnosis Date    Gastrointestinal disorder     Septic thrombophlebitis       Past Surgical History:   Procedure Laterality Date    HX HEENT       Social History     Tobacco Use    Smoking status: Former     Types: Cigarettes     Passive exposure: Never    Smokeless tobacco: Never   Substance Use Topics    Alcohol use: Not Currently     Comment: occasional      FH:    F - Rheumatoid Arthritis    Allergies   Allergen Reactions    Shellfish Derived Angioedema        Prior to Admission medications    Medication Sig Start Date End Date Taking? Authorizing Provider   oxyCODONE-acetaminophen (Percocet) 5-325 mg per tablet Take 1 Tablet by mouth every four (4) hours as needed for Pain. Yes Spenser, MD Luz   acetaminophen (TYLENOL) 500 mg tablet Take 500 mg by mouth every six (6) hours as needed for Pain. Yes Spenser, MD Luz   PNV Comb #2-Iron-FA-Omega 3 29-1-400 mg cmpk Take  by mouth. Yes Spenser, MD Luz   B.animalis,bifid,infantis,long (Probiotic 4X) 10-15 mg TbEC Take  by mouth. Yes Spenser, MD Luz   calcium carbonate (TUMS) 200 mg calcium (500 mg) chew Take 2 Tablets by mouth daily. Yes Spenser, MD Luz   mupirocin (BACTROBAN) 2 % ointment Apply  to affected area daily. Yes Luz Dueñas MD   ibuprofen (MOTRIN) 800 mg tablet Take 800 mg by mouth.   Patient not taking: Reported on 11/27/2022    Luz Dueñas MD       REVIEW OF SYSTEMS:  See HPI for details  General: +fever, chills, sweats, weakness, weight loss  Eyes: negative for blurred vision, eye pain, loss of vision, diplopia  Ear Nose and Throat: negative for rhinorrhea, pharyngitis, otalgia, tinnitus, speech or swallowing difficulties  Respiratory:  negative for pleuritic pain, cough, sputum production, wheezing, SOB, DEE  Cardiology:  negative for chest pain, palpitations, orthopnea, PND, edema, syncope   Gastrointestinal: negative for abdominal pain, +N/+V, dysphagia, change in bowel habits, +bloody diarrhea  Genitourinary: negative for frequency, urgency, dysuria, hematuria, incontinence  Muskuloskeletal : +arthralgia, myalgia  Hematology: negative for easy bruising, bleeding, lymphadenopathy  Dermatological: negative for rash, ulceration, mole change, new lesion  Endocrine: negative for hot flashes or polydipsia  Neurological: +headache, dizziness, confusion, focal weakness, paresthesia, memory loss, gait disturbance  Psychological: negative for anxiety, depression, agitation    Objective:   VITALS:    Visit Vitals  /66   Pulse (!) 102   Temp 100.3 °F (37.9 °C)   Resp 18   Ht 5' 5\" (1.651 m)   Wt 66.2 kg (145 lb 15.1 oz)   SpO2 94%   BMI 24.29 kg/m²     PHYSICAL EXAM:    Physical Exam:    Gen: Well-developed, well-nourished, in no acute distress  HEENT:  Pink conjunctivae, PERRL, hearing intact to voice, moist mucous membranes  Neck: Supple, without masses, thyroid non-tender  Resp: No accessory muscle use, clear breath sounds without wheezes rales or rhonchi  Card: No murmurs, normal S1, S2 without thrills, bruits or peripheral edema  Abd:  Soft, non-tender, non-distended, normoactive bowel sounds are present, no palpable organomegaly and no detectable hernias  Lymph:  No cervical or inguinal adenopathy  Musc: No cyanosis or clubbing  Skin: No rashes or ulcers, skin turgor is good  Neuro:  Cranial nerves are grossly intact, no focal motor weakness, follows commands appropriately  Psych:  Good insight, oriented to person, place and time, alert    R forearm w/ chronic wound w/ scab  _______________________________________________________________________  Care Plan discussed with:  Pt's condition, Imaging findings, Lab findings, Assessment, and Care Plan discussed with: Patient  _______________________________________________________________________    Probable disposition:  Home  ________________________________________________________________________    Comments   >50% of visit spent in counseling and coordination of care  Chart reviewed  Discussion with patient and/or family and questions answered     ________________________________________________________________________  Signed: No Patrick MD        Procedures: see electronic medical records for all procedures/Xrays and details which were not copied into this note but were reviewed prior to creation of Plan. LAB DATA REVIEWED:    Recent Results (from the past 24 hour(s))   POC LACTIC ACID    Collection Time: 11/27/22  2:23 PM   Result Value Ref Range    Lactic Acid (POC) 0.85 0.40 - 2.00 mmol/L   CBC WITH AUTOMATED DIFF    Collection Time: 11/27/22  2:24 PM   Result Value Ref Range    WBC 10.8 3.6 - 11.0 K/uL    RBC 3.64 (L) 3.80 - 5.20 M/uL    HGB 11.6 11.5 - 16.0 g/dL    HCT 35.0 35.0 - 47.0 %    MCV 96.2 80.0 - 99.0 FL    MCH 31.9 26.0 - 34.0 PG    MCHC 33.1 30.0 - 36.5 g/dL    RDW 13.8 11.5 - 14.5 %    PLATELET 931 897 - 885 K/uL    MPV 8.4 (L) 8.9 - 12.9 FL    NRBC 0.0 0.0  WBC    ABSOLUTE NRBC 0.00 0.00 - 0.01 K/uL    NEUTROPHILS 56 32 - 75 %    LYMPHOCYTES 18 12 - 49 %    MONOCYTES 13 5 - 13 %    IMMATURE GRANULOCYTES 0 0 - 0.5 %    ABS. NEUTROPHILS 6.1 1.8 - 8.0 K/UL    ABS. LYMPHOCYTES 2.0 0.8 - 3.5 K/UL    ABS. MONOCYTES 1.4 (H) 0.0 - 1.0 K/UL    ABS. IMM. GRANS. 0 0.00 - 0.04 K/UL    EOSINOPHILS 11 (H) 0 - 7 %    BASOPHILS 1 0 - 1 %    ABS. EOSINOPHILS 1.2 (H) 0.0 - 0.4 K/UL    ABS.  BASOPHILS 0.1 0.0 - 0.1 K/UL    DF SMEAR SCANNED      RBC COMMENTS NORMOCYTIC, NORMOCHROMIC     METABOLIC PANEL, COMPREHENSIVE Collection Time: 11/27/22  2:24 PM   Result Value Ref Range    Sodium 141 136 - 145 mmol/L    Potassium 3.5 3.5 - 5.1 mmol/L    Chloride 104 97 - 108 mmol/L    CO2 28 21 - 32 mmol/L    Anion gap 9 5 - 15 mmol/L    Glucose 101 (H) 65 - 100 mg/dL    BUN 5 (L) 6 - 20 MG/DL    Creatinine 0.71 0.55 - 1.02 MG/DL    BUN/Creatinine ratio 7 (L) 12 - 20      eGFR >60 >60 ml/min/1.73m2    Calcium 8.1 (L) 8.5 - 10.1 MG/DL    Bilirubin, total 0.4 0.2 - 1.0 MG/DL    ALT (SGPT) 21 12 - 78 U/L    AST (SGOT) 21 15 - 37 U/L    Alk. phosphatase 65 45 - 117 U/L    Protein, total 6.9 6.4 - 8.2 g/dL    Albumin 2.8 (L) 3.5 - 5.0 g/dL    Globulin 4.1 (H) 2.0 - 4.0 g/dL    A-G Ratio 0.7 (L) 1.1 - 2.2     URINALYSIS W/MICROSCOPIC    Collection Time: 11/27/22  2:24 PM   Result Value Ref Range    Color YELLOW/STRAW      Appearance HAZY (A) CLEAR      Specific gravity 1.006 1.003 - 1.030      pH (UA) 6.0 5.0 - 8.0      Protein Negative NEG mg/dL    Glucose Negative NEG mg/dL    Ketone Negative NEG mg/dL    Bilirubin Negative NEG      Blood MODERATE (A) NEG      Urobilinogen 0.2 0.2 - 1.0 EU/dL    Nitrites Negative NEG      Leukocyte Esterase MODERATE (A) NEG      WBC 0-4 0 - 4 /hpf    RBC 0-5 0 - 5 /hpf    Epithelial cells FEW FEW /lpf    Bacteria Negative NEG /hpf   URINE CULTURE HOLD SAMPLE    Collection Time: 11/27/22  2:24 PM    Specimen: Serum; Urine   Result Value Ref Range    Urine culture hold        Urine on hold in Microbiology dept for 2 days. If unpreserved urine is submitted, it cannot be used for addtional testing after 24 hours, recollection will be required.    TROPONIN-HIGH SENSITIVITY    Collection Time: 11/27/22  2:24 PM   Result Value Ref Range    Troponin-High Sensitivity 4 0 - 51 ng/L   D DIMER    Collection Time: 11/27/22  2:24 PM   Result Value Ref Range    D-dimer 12.16 (H) 0.00 - 0.65 mg/L FEU   COVID-19 RAPID TEST    Collection Time: 11/27/22  2:24 PM   Result Value Ref Range    Specimen source Nasopharyngeal COVID-19 rapid test Not detected NOTD     INFLUENZA A+B VIRAL AGS    Collection Time: 11/27/22  2:24 PM   Result Value Ref Range    Influenza A Antigen Negative NEG      Influenza B Antigen Negative NEG     LIPASE    Collection Time: 11/27/22  2:24 PM   Result Value Ref Range    Lipase 65 (L) 73 - 393 U/L   MAGNESIUM    Collection Time: 11/27/22  2:24 PM   Result Value Ref Range    Magnesium 1.6 1.6 - 2.4 mg/dL   EKG, 12 LEAD, INITIAL    Collection Time: 11/27/22  2:43 PM   Result Value Ref Range    Ventricular Rate 101 BPM    Atrial Rate 101 BPM    P-R Interval 134 ms    QRS Duration 78 ms    Q-T Interval 354 ms    QTC Calculation (Bezet) 459 ms    Calculated P Axis 42 degrees    Calculated R Axis 41 degrees    Calculated T Axis 4 degrees    Diagnosis       Sinus tachycardia  Possible Left atrial enlargement  Borderline ECG  No previous ECGs available

## 2022-11-29 ENCOUNTER — APPOINTMENT (OUTPATIENT)
Dept: CT IMAGING | Age: 37
DRG: 776 | End: 2022-11-29
Attending: INTERNAL MEDICINE
Payer: COMMERCIAL

## 2022-11-29 ENCOUNTER — APPOINTMENT (OUTPATIENT)
Dept: NON INVASIVE DIAGNOSTICS | Age: 37
DRG: 776 | End: 2022-11-29
Attending: INTERNAL MEDICINE
Payer: COMMERCIAL

## 2022-11-29 LAB
ANION GAP SERPL CALC-SCNC: 8 MMOL/L (ref 5–15)
BASOPHILS # BLD: 0.1 K/UL (ref 0–0.1)
BASOPHILS NFR BLD: 1 % (ref 0–1)
BUN SERPL-MCNC: 4 MG/DL (ref 6–20)
BUN/CREAT SERPL: 6 (ref 12–20)
CALCIUM SERPL-MCNC: 8.1 MG/DL (ref 8.5–10.1)
CHLORIDE SERPL-SCNC: 105 MMOL/L (ref 97–108)
CO2 SERPL-SCNC: 23 MMOL/L (ref 21–32)
CREAT SERPL-MCNC: 0.63 MG/DL (ref 0.55–1.02)
DIFFERENTIAL METHOD BLD: ABNORMAL
ECHO AO ASC DIAM: 2.1 CM
ECHO AO ASCENDING AORTA INDEX: 1.21 CM/M2
ECHO AV AREA PEAK VELOCITY: 2.6 CM2
ECHO AV AREA VTI: 3 CM2
ECHO AV AREA/BSA PEAK VELOCITY: 1.5 CM2/M2
ECHO AV AREA/BSA VTI: 1.7 CM2/M2
ECHO AV MEAN GRADIENT: 3 MMHG
ECHO AV MEAN VELOCITY: 0.8 M/S
ECHO AV PEAK GRADIENT: 5 MMHG
ECHO AV PEAK VELOCITY: 1.1 M/S
ECHO AV VELOCITY RATIO: 0.82
ECHO AV VTI: 21 CM
ECHO LA DIAMETER INDEX: 1.56 CM/M2
ECHO LA DIAMETER: 2.7 CM
ECHO LA VOL 2C: 22 ML (ref 22–52)
ECHO LA VOL 4C: 32 ML (ref 22–52)
ECHO LA VOL BP: 33 ML (ref 22–52)
ECHO LA VOL/BSA BIPLANE: 19 ML/M2 (ref 16–34)
ECHO LA VOLUME AREA LENGTH: 40 ML
ECHO LA VOLUME INDEX A2C: 13 ML/M2 (ref 16–34)
ECHO LA VOLUME INDEX A4C: 18 ML/M2 (ref 16–34)
ECHO LA VOLUME INDEX AREA LENGTH: 23 ML/M2 (ref 16–34)
ECHO LV E' LATERAL VELOCITY: 15 CM/S
ECHO LV E' SEPTAL VELOCITY: 15 CM/S
ECHO LV EDV A2C: 66 ML
ECHO LV EDV A4C: 71 ML
ECHO LV EDV BP: 68 ML (ref 56–104)
ECHO LV EDV INDEX A4C: 41 ML/M2
ECHO LV EDV INDEX BP: 39 ML/M2
ECHO LV EDV NDEX A2C: 38 ML/M2
ECHO LV EJECTION FRACTION A2C: 60 %
ECHO LV EJECTION FRACTION A4C: 61 %
ECHO LV EJECTION FRACTION BIPLANE: 59 % (ref 55–100)
ECHO LV ESV A2C: 26 ML
ECHO LV ESV A4C: 28 ML
ECHO LV ESV BP: 28 ML (ref 19–49)
ECHO LV ESV INDEX A2C: 15 ML/M2
ECHO LV ESV INDEX A4C: 16 ML/M2
ECHO LV ESV INDEX BP: 16 ML/M2
ECHO LV FRACTIONAL SHORTENING: 36 % (ref 28–44)
ECHO LV INTERNAL DIMENSION DIASTOLE INDEX: 2.08 CM/M2
ECHO LV INTERNAL DIMENSION DIASTOLIC: 3.6 CM (ref 3.9–5.3)
ECHO LV INTERNAL DIMENSION SYSTOLIC INDEX: 1.33 CM/M2
ECHO LV INTERNAL DIMENSION SYSTOLIC: 2.3 CM
ECHO LV IVSD: 1.3 CM (ref 0.6–0.9)
ECHO LV MASS 2D: 160.1 G (ref 67–162)
ECHO LV MASS INDEX 2D: 92.5 G/M2 (ref 43–95)
ECHO LV POSTERIOR WALL DIASTOLIC: 1.3 CM (ref 0.6–0.9)
ECHO LV RELATIVE WALL THICKNESS RATIO: 0.72
ECHO LVOT AREA: 3.1 CM2
ECHO LVOT AV VTI INDEX: 0.93
ECHO LVOT DIAM: 2 CM
ECHO LVOT MEAN GRADIENT: 2 MMHG
ECHO LVOT PEAK GRADIENT: 3 MMHG
ECHO LVOT PEAK VELOCITY: 0.9 M/S
ECHO LVOT STROKE VOLUME INDEX: 35.4 ML/M2
ECHO LVOT SV: 61.2 ML
ECHO LVOT VTI: 19.5 CM
ECHO MV A VELOCITY: 0.53 M/S
ECHO MV E DECELERATION TIME (DT): 155.6 MS
ECHO MV E VELOCITY: 0.69 M/S
ECHO MV E/A RATIO: 1.3
ECHO MV E/E' LATERAL: 4.6
ECHO MV E/E' RATIO (AVERAGED): 4.6
ECHO MV E/E' SEPTAL: 4.6
ECHO PV MAX VELOCITY: 0.8 M/S
ECHO PV PEAK GRADIENT: 3 MMHG
ECHO RV INTERNAL DIMENSION: 3.9 CM
ECHO RV TAPSE: 1.9 CM (ref 1.7–?)
ECHO RVOT PEAK GRADIENT: 2 MMHG
ECHO RVOT PEAK VELOCITY: 0.6 M/S
ECHO TV REGURGITANT MAX VELOCITY: 2.21 M/S
ECHO TV REGURGITANT PEAK GRADIENT: 19 MMHG
EOSINOPHIL # BLD: 1.2 K/UL (ref 0–0.4)
EOSINOPHIL NFR BLD: 11 % (ref 0–7)
ERYTHROCYTE [DISTWIDTH] IN BLOOD BY AUTOMATED COUNT: 13.6 % (ref 11.5–14.5)
GLUCOSE SERPL-MCNC: 100 MG/DL (ref 65–100)
HCT VFR BLD AUTO: 33.6 % (ref 35–47)
HGB BLD-MCNC: 11.2 G/DL (ref 11.5–16)
IMM GRANULOCYTES # BLD AUTO: 0 K/UL
IMM GRANULOCYTES NFR BLD AUTO: 0 %
LYMPHOCYTES # BLD: 1.5 K/UL (ref 0.8–3.5)
LYMPHOCYTES NFR BLD: 14 % (ref 12–49)
MCH RBC QN AUTO: 31.7 PG (ref 26–34)
MCHC RBC AUTO-ENTMCNC: 33.3 G/DL (ref 30–36.5)
MCV RBC AUTO: 95.2 FL (ref 80–99)
METAMYELOCYTES NFR BLD MANUAL: 1 %
MONOCYTES # BLD: 1.2 K/UL (ref 0–1)
MONOCYTES NFR BLD: 11 % (ref 5–13)
NEUTS BAND NFR BLD MANUAL: 16 % (ref 0–6)
NEUTS SEG # BLD: 6.8 K/UL (ref 1.8–8)
NEUTS SEG NFR BLD: 46 % (ref 32–75)
NRBC # BLD: 0 K/UL (ref 0–0.01)
NRBC BLD-RTO: 0 PER 100 WBC
PLATELET # BLD AUTO: 432 K/UL (ref 150–400)
PMV BLD AUTO: 8.4 FL (ref 8.9–12.9)
POTASSIUM SERPL-SCNC: 3 MMOL/L (ref 3.5–5.1)
RBC # BLD AUTO: 3.53 M/UL (ref 3.8–5.2)
RBC MORPH BLD: ABNORMAL
SODIUM SERPL-SCNC: 136 MMOL/L (ref 136–145)
WBC # BLD AUTO: 10.9 K/UL (ref 3.6–11)
WBC #/AREA STL HPF: >20 /HPF (ref 0–4)

## 2022-11-29 PROCEDURE — 99223 1ST HOSP IP/OBS HIGH 75: CPT | Performed by: INTERNAL MEDICINE

## 2022-11-29 PROCEDURE — 74011636637 HC RX REV CODE- 636/637: Performed by: INTERNAL MEDICINE

## 2022-11-29 PROCEDURE — 65270000029 HC RM PRIVATE

## 2022-11-29 PROCEDURE — 73201 CT UPPER EXTREMITY W/DYE: CPT

## 2022-11-29 PROCEDURE — 93306 TTE W/DOPPLER COMPLETE: CPT

## 2022-11-29 PROCEDURE — 74011250637 HC RX REV CODE- 250/637: Performed by: INTERNAL MEDICINE

## 2022-11-29 PROCEDURE — 74011000250 HC RX REV CODE- 250: Performed by: INTERNAL MEDICINE

## 2022-11-29 PROCEDURE — 85025 COMPLETE CBC W/AUTO DIFF WBC: CPT

## 2022-11-29 PROCEDURE — 74011000636 HC RX REV CODE- 636: Performed by: INTERNAL MEDICINE

## 2022-11-29 PROCEDURE — 36415 COLL VENOUS BLD VENIPUNCTURE: CPT

## 2022-11-29 PROCEDURE — 93306 TTE W/DOPPLER COMPLETE: CPT | Performed by: INTERNAL MEDICINE

## 2022-11-29 PROCEDURE — G0378 HOSPITAL OBSERVATION PER HR: HCPCS

## 2022-11-29 PROCEDURE — 80048 BASIC METABOLIC PNL TOTAL CA: CPT

## 2022-11-29 RX ORDER — POTASSIUM CHLORIDE 750 MG/1
40 TABLET, FILM COATED, EXTENDED RELEASE ORAL
Status: COMPLETED | OUTPATIENT
Start: 2022-11-29 | End: 2022-11-29

## 2022-11-29 RX ORDER — PREDNISONE 20 MG/1
20 TABLET ORAL
Status: DISCONTINUED | OUTPATIENT
Start: 2022-11-29 | End: 2022-11-30 | Stop reason: HOSPADM

## 2022-11-29 RX ADMIN — POTASSIUM CHLORIDE 40 MEQ: 750 TABLET, FILM COATED, EXTENDED RELEASE ORAL at 11:19

## 2022-11-29 RX ADMIN — PREDNISONE 20 MG: 20 TABLET ORAL at 11:19

## 2022-11-29 RX ADMIN — IOPAMIDOL 100 ML: 612 INJECTION, SOLUTION INTRAVENOUS at 13:23

## 2022-11-29 RX ADMIN — SODIUM CHLORIDE, PRESERVATIVE FREE 10 ML: 5 INJECTION INTRAVENOUS at 05:44

## 2022-11-29 NOTE — PROGRESS NOTES
Bedside and Verbal shift change report given to JACK Champion (oncoming nurse) by Mae HARRINGTON RN (offgoing nurse). Report included the following information SBAR, Kardex, Intake/Output, MAR, and Recent Results.

## 2022-11-29 NOTE — PROGRESS NOTES
11/29/2022  Case Management Progress Note    10:02 AM  Patient is 40year old female admitted 11/27 with fever  Patient's RUR is 8% green/low risk for readmission  Covid test: negative 11/27  Chart reviewed  Per chart patient is awaiting ID consult to help determine discharge recommendations. She is independent with all ADLs and lives with her  and children. Plan remains for her to return home with family assistance when she is medically ready to do so. Will continue to follow and assist with discharge planning as needed.      Transition of Care Plan  Continue medical management/treatment  Home with family when ready   Family will transport at discharge  Follow up outpatient as indicated  CM will continue to follow and assist as indicated    Lashell Wright, MSW

## 2022-11-29 NOTE — PROGRESS NOTES
Spoke to Dr Chuck Kraft, Infections Disease to let him know that pt was still waiting for a ID consult. MD stated that he will see the pt tomorrow, but not in the am. MD has the name and the room number. Pt has been updated.

## 2022-11-29 NOTE — PROGRESS NOTES
Progress Note  Date:2022       Room:[facility]  Patient Name:Minnie Will     YOB: 1985     Age:37 y.o. Subjective    Subjective:  Symptoms:  Stable. She reports diarrhea. Diet:  No nausea or vomiting. Pain:  She complains of pain that is mild. Review of Systems   Constitutional:  Negative for appetite change, fatigue and fever. Gastrointestinal:  Positive for abdominal pain, blood in stool and diarrhea. Negative for nausea and vomiting. Skin:  Negative for pallor. Objective         Vitals Last 24 Hours:  TEMPERATURE:  Temp  Av.8 °F (37.1 °C)  Min: 97.6 °F (36.4 °C)  Max: 100.2 °F (37.9 °C)  RESPIRATIONS RANGE: Resp  Avg: 15.9  Min: 15  Max: 16  PULSE OXIMETRY RANGE: SpO2  Av.9 %  Min: 92 %  Max: 96 %  PULSE RANGE: Pulse  Av.6  Min: 91  Max: 98  BLOOD PRESSURE RANGE: Systolic (93NZJ), VGR:043 , Min:99 , AOC:264   ; Diastolic (10ZYW), KEY:45, Min:63, Max:73    I/O (24Hr): Intake/Output Summary (Last 24 hours) at 2022 1043  Last data filed at 2022 0434  Gross per 24 hour   Intake 1220 ml   Output 0 ml   Net 1220 ml     Objective:  General Appearance:  Comfortable and not in pain. Vital signs: (most recent): Blood pressure 103/73, pulse 91, temperature 97.6 °F (36.4 °C), resp. rate 16, height 5' 5\" (1.651 m), weight 65.8 kg (145 lb), SpO2 95 %, currently breastfeeding. No fever. Output: Producing stool. HEENT: Normal HEENT exam.    Lungs:  Normal effort and normal respiratory rate. Breath sounds clear to auscultation. Heart: Normal rate. Regular rhythm. S1 normal and S2 normal.  No murmur. Abdomen: Abdomen is soft and non-distended. Bowel sounds are normal.   There is generalized tenderness. (Less tender on exam compared to yesterday). Extremities: Normal range of motion. There is no dependent edema. Pulses: Distal pulses are intact.     Neurological: Patient is alert and oriented to person, place and time. Pupils:  Pupils are equal, round, and reactive to light. Skin:  Warm and dry. Labs/Imaging/Diagnostics    Labs:  CBC:  Recent Labs     11/29/22  0238 11/27/22  1424   WBC 10.9 10.8   RBC 3.53* 3.64*   HGB 11.2* 11.6   HCT 33.6* 35.0   MCV 95.2 96.2   RDW 13.6 13.8   * 398     CHEMISTRIES:  Recent Labs     11/29/22  0238 11/27/22  1424    141   K 3.0* 3.5    104   CO2 23 28   BUN 4* 5*   CA 8.1* 8.1*   MG  --  1.6   PT/INR:No results for input(s): INR, INREXT in the last 72 hours. No lab exists for component: PROTIME  APTT:No results for input(s): APTT in the last 72 hours. LIVER PROFILE:  Recent Labs     11/27/22  1424   AST 21   ALT 21     Lab Results   Component Value Date/Time    ALT (SGPT) 21 11/27/2022 02:24 PM    AST (SGOT) 21 11/27/2022 02:24 PM    Alk. phosphatase 65 11/27/2022 02:24 PM    Bilirubin, total 0.4 11/27/2022 02:24 PM       Imaging Last 24 Hours:  No results found. Assessment//Plan   Active Problems:    Fever (11/27/2022)      Assessment:   (GI consultation for rectal bleeding, diarrhea, abdominal pain, and pancolitis on CT. 44-year-old female with history of ulcerative proctitis first diagnosed in 2012 but progressing to left-sided UC by the time of her last office visit in 2018. At that time she was taking mesalamine twice a day as well as some herbal products but has not consistently taken any medication since 2019. Her last colonoscopy was in 2018 with UC from rectum to descending colon. She reports having taken mesalamine's and azathioprine in the past.  She reports having diarrhea, rectal bleeding for about a month and a half and developed a fever while she was in labor which has persisted. She has been on multiple rounds of antibiotics. She states her UC flared up during her first trimester and continued to be symptomatic for most of her pregnancy. Stool studies are pending. WBC 10.8.     11/29/2022: Less tender on exam today.  Echo ordered and tech here to perform. Stool studies negative except > 20 WBC. ID consulted but hasn't seen yet. She is eager to go home to see new baby. ). Plan:    (- If ID agrees, recommend starting prednisone 20 mg daily.   - Needs a maintenance medication for her UC but I believe she is wanting to use herbal products and not sure she would be receptive to a maintenance medication. This can be discussed in an OP visit. Quantiferon Gold and TPMT ordered. ).      Electronically signed by Berny Elkins NP on 11/29/2022 at 10:43 AM  I have interviewed and examined patient with addendum to note above and formulation care plan to reflect my evaluation    Gigi Shetty M.D.

## 2022-11-29 NOTE — CONSULTS
Infectious Disease Consult    Impression/Plan   Fever. Concern for thrombophlebitis however this is clinically improving and blood cultures x2 sets are sterile to date. Patient received Dalvance 11/21 so is covered for GPC infection including MRSA through at least 12/4/2022. Drug fever due to Dalvance also in the differential however fevers were present prior to receiving this drug. CT scan C-A-P negative for any acute infectious process. C. difficile assay and enteric pathogen panel negative. UA bland. TTE pending but very low suspicion for endocarditis. Will check CT of the right forearm to rule out abscess however there is low suspicion for this as well. We will start prednisone 20 mg daily at the recommendation of GI. Monitor overnight on steroids. If patient remains stable plan for discharge tomorrow  RUE thrombophlebitis at previous IV site. This does not appear to be acutely infected. Check CT scan as above  Ulcerative colitis. GI following. Prednisone to be started today  Eosinophilia. Possible drug reaction? Follow trend  Bandemia. Follow in hospital overnight  Status post spontaneous vaginal delivery 10/28/2022. Patient is breast-feeding    Anti-infectives:   Dalvance. Administered 11/21/2022    Subjective:   Date of Consultation:  November 29, 2022  Date of Admission: 11/27/2022   Referring Physician:     Patient is a 40 y.o. female with a past medical history of ulcerative colitis who is being seen for fevers. She recently had spontaneous vaginal delivery on Oct 28th. Hospital course was complicated by infiltration of her IV with resulting erythema and pain. She was initially treated with both cephalexin and clindamycin. She was seen by her primary care doctor who cultured the site which returned with no growth. Due to ongoing pain she was seen in the ER on 11/21 where a dose of Dalvance was arranged at the infusion center.   She states the pain and erythema at the IV site have improved since receiving the Dalvance but the fevers have persisted. She also continues to experience bloody diarrhea due to ulcerative colitis flare. Stool studies at this admission have been unremarkable. She is not currently on any antibiotics. The infectious diseases service has been asked to assist with antibiotic management     Patient Active Problem List   Diagnosis Code    Fever R50.9     Past Medical History:   Diagnosis Date    Gastrointestinal disorder     Septic thrombophlebitis       History reviewed. No pertinent family history. Social History     Tobacco Use    Smoking status: Former     Types: Cigarettes     Passive exposure: Never    Smokeless tobacco: Never   Substance Use Topics    Alcohol use: Not Currently     Comment: occasional     Past Surgical History:   Procedure Laterality Date    HX HEENT        Prior to Admission medications    Medication Sig Start Date End Date Taking? Authorizing Provider   oxyCODONE-acetaminophen (Percocet) 5-325 mg per tablet Take 1 Tablet by mouth every four (4) hours as needed for Pain. Yes Spenser, MD Luz   acetaminophen (TYLENOL) 500 mg tablet Take 500 mg by mouth every six (6) hours as needed for Pain. Yes Other, MD Luz   PNV Comb #2-Iron-FA-Omega 3 29-1-400 mg cmpk Take  by mouth. Yes Spenser, MD Luz   B.animalis,bifid,infantis,long (Probiotic 4X) 10-15 mg TbEC Take  by mouth. Yes Spenser, MD Luz   calcium carbonate (TUMS) 200 mg calcium (500 mg) chew Take 2 Tablets by mouth daily. Yes Spenser, MD Luz   mupirocin (BACTROBAN) 2 % ointment Apply  to affected area daily. Yes Spenser, MD Luz   ibuprofen (MOTRIN) 800 mg tablet Take 800 mg by mouth. Patient not taking: Reported on 11/27/2022    Other, MD Luz     Allergies   Allergen Reactions    Shellfish Derived Angioedema        Review of Systems:  A comprehensive review of systems was negative except for that written in the History of Present Illness.     Objective:   Blood pressure 103/73, pulse 91, temperature 97.6 °F (36.4 °C), resp. rate 16, height 5' 5\" (1.651 m), weight 145 lb (65.8 kg), SpO2 95 %, currently breastfeeding. Temp (24hrs), Av.8 °F (37.1 °C), Min:97.6 °F (36.4 °C), Max:100.2 °F (37.9 °C)       Exam:      General:  Alert, cooperative, well noursished, well developed, appears stated age   Eyes:  Sclera anicteric. Mouth/Throat: Mucous membranes normal   Neck: Supple   Lungs:   Clear to auscultation bilaterally   CV:  Regular rate and rhythm. No murmur   Abdomen:   Nondistended   Extremities: No edema. Area of eschar on the volar aspect of the right forearm. No surrounding erythema, induration, or purulence   Skin: No acute rash on exposed skin   Lymph nodes:    Musculoskeletal: Moves all   Lines/Devices:  Peripheral   Psych: Alert and oriented, normal mood affect given the setting       Data Review:   Recent Results (from the past 24 hour(s))   CBC WITH AUTOMATED DIFF    Collection Time: 22  2:38 AM   Result Value Ref Range    WBC 10.9 3.6 - 11.0 K/uL    RBC 3.53 (L) 3.80 - 5.20 M/uL    HGB 11.2 (L) 11.5 - 16.0 g/dL    HCT 33.6 (L) 35.0 - 47.0 %    MCV 95.2 80.0 - 99.0 FL    MCH 31.7 26.0 - 34.0 PG    MCHC 33.3 30.0 - 36.5 g/dL    RDW 13.6 11.5 - 14.5 %    PLATELET 193 (H) 562 - 400 K/uL    MPV 8.4 (L) 8.9 - 12.9 FL    NRBC 0.0 0  WBC    ABSOLUTE NRBC 0.00 0.00 - 0.01 K/uL    NEUTROPHILS 46 32 - 75 %    BAND NEUTROPHILS 16 (H) 0 - 6 %    LYMPHOCYTES 14 12 - 49 %    MONOCYTES 11 5 - 13 %    EOSINOPHILS 11 (H) 0 - 7 %    BASOPHILS 1 0 - 1 %    METAMYELOCYTES 1 (H) 0 %    IMMATURE GRANULOCYTES 0 %    ABS. NEUTROPHILS 6.8 1.8 - 8.0 K/UL    ABS. LYMPHOCYTES 1.5 0.8 - 3.5 K/UL    ABS. MONOCYTES 1.2 (H) 0.0 - 1.0 K/UL    ABS. EOSINOPHILS 1.2 (H) 0.0 - 0.4 K/UL    ABS. BASOPHILS 0.1 0.0 - 0.1 K/UL    ABS. IMM.  GRANS. 0.0 K/UL    DF MANUAL      RBC COMMENTS NORMOCYTIC, NORMOCHROMIC     METABOLIC PANEL, BASIC    Collection Time: 22  2:38 AM   Result Value Ref Range Sodium 136 136 - 145 mmol/L    Potassium 3.0 (L) 3.5 - 5.1 mmol/L    Chloride 105 97 - 108 mmol/L    CO2 23 21 - 32 mmol/L    Anion gap 8 5 - 15 mmol/L    Glucose 100 65 - 100 mg/dL    BUN 4 (L) 6 - 20 MG/DL    Creatinine 0.63 0.55 - 1.02 MG/DL    BUN/Creatinine ratio 6 (L) 12 - 20      eGFR >60 >60 ml/min/1.73m2    Calcium 8.1 (L) 8.5 - 10.1 MG/DL   ECHO ADULT COMPLETE    Collection Time: 11/29/22  9:06 AM   Result Value Ref Range    IVSd 1.3 (A) 0.6 - 0.9 cm    LVIDd 3.6 (A) 3.9 - 5.3 cm    LVIDs 2.3 cm    LVOT Diameter 2.0 cm    LVPWd 1.3 (A) 0.6 - 0.9 cm    EF BP 59 55 - 100 %    LV Ejection Fraction A2C 60 %    LV Ejection Fraction A4C 61 %    LV EDV A2C 66 mL    LV EDV A4C 71 mL    LV EDV BP 68 56 - 104 mL    LV ESV A2C 26 mL    LV ESV A4C 28 mL    LV ESV BP 28 19 - 49 mL    LVOT Peak Gradient 3 mmHg    LVOT Mean Gradient 2 mmHg    LVOT SV 63.2 ml    LVOT Peak Velocity 0.9 m/s    LVOT VTI 19.5 cm    RVIDd 3.9 cm    RVOT Peak Gradient 2 mmHg    RVOT Peak Velocity 0.6 m/s    LA Diameter 2.7 cm    LA Volume A/L 40 mL    LA Volume 2C 22 22 - 52 mL    LA Volume 4C 32 22 - 52 mL    LA Volume BP 33 22 - 52 mL    AV Area by Peak Velocity 2.6 cm2    AV Area by VTI 3.0 cm2    AV Peak Gradient 5 mmHg    AV Mean Gradient 3 mmHg    AV Peak Velocity 1.1 m/s    AV Mean Velocity 0.8 m/s    AV VTI 21.0 cm    MV A Velocity 0.53 m/s    MV E Wave Deceleration Time 155.6 ms    MV E Velocity 0.69 m/s    LV E' Lateral Velocity 15 cm/s    LV E' Septal Velocity 15 cm/s    PV Peak Gradient 3 mmHg    PV Max Velocity 0.8 m/s    TAPSE 1.9 1.7 cm    TR Peak Gradient 19 mmHg    TR Max Velocity 2.21 m/s    Ascending Aorta 2.1 cm        Microbiology:      Studies:      Signed By: Quincy Spain DO     November 29, 2022

## 2022-11-29 NOTE — PROGRESS NOTES
Jairo Fermin [facility] [place] [address]  [address] [facility], [address] [place] [address]  [phone]      Hospitalist Progress Note      NAME: Catherine Ramírez   :  1985  MRM:  [id number]    Date of service: 2022  7:01 AM       Assessment and Plan:, stool studies   Fever of unknown origin: on and off for few weeks. CTA of the chest is unremarkable for infection. UA is OK. Stool studies are negative. Covid and flu test negative. Check BC. Consult ID     2. Ulcerative Colitis/ bloody diarrhea: CT of the abdomen: Pancolonic wall thickening and inflammation suggestive of infectious versus inflammatory colitis. GI following. Started on prednisone. 3.  3 mm nodule superior segment right lower lobe. Out pt CT FU in a year. Discussed with PT     4. Hypokalemia. Replete. Subjective:     Chief Complaint[de-identified] Patient was seen and examined as a follow up for fever. Chart was reviewed. feels better. Wants to go home     ROS:  (bold if positive, if negative)    Tolerating PT  Tolerating Diet        Objective:     Last 24hrs VS reviewed since prior progress note.  Most recent are:    Visit Vitals  /73 (BP 1 Location: Right upper arm, BP Patient Position: Semi fowlers)   Pulse 91   Temp 97.6 °F (36.4 °C)   Resp 16   Ht 5' 5\" (1.651 m)   Wt 65.8 kg (145 lb)   SpO2 95%   BMI 24.13 kg/m²     SpO2 Readings from Last 6 Encounters:   22 95%   22 97%   22 96%   22 99%          Intake/Output Summary (Last 24 hours) at 2022 1021  Last data filed at 2022 0434  Gross per 24 hour   Intake 1220 ml   Output 0 ml   Net 1220 ml          Physical Exam:    Gen:  Well-developed, well-nourished, in no acute distress  HEENT:  Pink conjunctivae, PERRL, hearing intact to voice, moist mucous membranes  Neck:  Supple, without masses, thyroid non-tender  Resp:  No accessory muscle use, clear breath sounds without wheezes rales or rhonchi  Card:  No murmurs, normal S1, S2 without thrills, bruits or peripheral edema  Abd:  Soft, non-tender, non-distended, normoactive bowel sounds are present, no palpable organomegaly and no detectable hernias  Lymph:  No cervical or inguinal adenopathy  Musc:  No cyanosis or clubbing  Skin:  No rashes or ulcers, skin turgor is good  Neuro:  Cranial nerves are grossly intact, no focal motor weakness, follows commands appropriately  Psych:  Good insight, oriented to person, place and time, alert  __________________________________________________________________  Medications Reviewed: (see below)  Medications:     Current Facility-Administered Medications   Medication Dose Route Frequency    potassium chloride SR (KLOR-CON 10) tablet 40 mEq  40 mEq Oral NOW    sodium chloride (NS) flush 5-40 mL  5-40 mL IntraVENous Q8H    sodium chloride (NS) flush 5-40 mL  5-40 mL IntraVENous PRN    acetaminophen (TYLENOL) tablet 650 mg  650 mg Oral Q6H PRN    Or    acetaminophen (TYLENOL) suppository 650 mg  650 mg Rectal Q6H PRN    polyethylene glycol (MIRALAX) packet 17 g  17 g Oral DAILY PRN    ondansetron (ZOFRAN ODT) tablet 4 mg  4 mg Oral Q8H PRN    Or    ondansetron (ZOFRAN) injection 4 mg  4 mg IntraVENous Q6H PRN        Lab Data Reviewed: (see below)  Lab Review:     Recent Labs     11/29/22  0238 11/27/22  1424   WBC 10.9 10.8   HGB 11.2* 11.6   HCT 33.6* 35.0   * 398       Recent Labs     11/29/22  0238 11/27/22  1424    141   K 3.0* 3.5    104   CO2 23 28    101*   BUN 4* 5*   CREA 0.63 0.71   CA 8.1* 8.1*   MG  --  1.6   ALB  --  2.8*   TBILI  --  0.4   ALT  --  21       No results found for: GLUCPOC  No results for input(s): PH, PCO2, PO2, HCO3, FIO2 in the last 72 hours. No results for input(s): INR, INREXT, INREXT in the last 72 hours.   All Micro Results       Procedure Component Value Units Date/Time    CULTURE, BLOOD [[id number]] Collected: 11/27/22 1424    Order Status: Completed Specimen: Blood Updated: 11/29/22 8475     Special Requests: NO SPECIAL REQUESTS        Culture result: NO GROWTH 2 DAYS       CULTURE, BLOOD [[id number]] Collected: 11/27/22 1425    Order Status: Completed Specimen: Blood Updated: 11/29/22 0459     Special Requests: NO SPECIAL REQUESTS        Culture result: NO GROWTH 2 DAYS       ENTERIC BACTERIA PANEL, DNA [[id number]] Collected: 11/28/22 1038    Order Status: Completed Specimen: Stool Updated: 11/28/22 2023     Shigella species, DNA Negative        Campylobacter species, DNA Negative        Vibrio species, DNA Negative        Enterotoxigen E Coli, DNA Negative        Shiga toxin producing, DNA Negative        Salmonella species, DNA Negative        P. shigelloides, DNA Negative        Y. enterocolitica, DNA Negative       C. DIFFICILE AG & TOXIN A/B [[id number]] Collected: 11/28/22 1038    Order Status: Completed Specimen: Stool Updated: 11/28/22 1659     [address] [place] ANTIGEN Negative        C. difficile toxin Negative        INTERPRETATION       NEGATIVE FOR TOXIGENIC C. DIFFICILE          COVID-19 RAPID TEST [[id number]] Collected: 11/27/22 1424    Order Status: Completed Specimen: Nasopharyngeal Updated: 11/27/22 1509     Specimen source Nasopharyngeal        COVID-19 rapid test Not detected        Comment: Rapid Abbott ID Now       Rapid NAAT:  The specimen is NEGATIVE for SARS-CoV-2, the novel coronavirus associated with COVID-19. Negative results should be treated as presumptive and, if inconsistent with clinical signs and symptoms or necessary for patient management, should be tested with an alternative molecular assay. Negative results do not preclude SARS-CoV-2 infection and should not be used as the sole basis for patient management decisions. This test has been authorized by the FDA under an Emergency Use Authorization (EUA) for use by authorized laboratories.    Fact sheet for Healthcare Providers:  gokit.za  Fact sheet for Patients: gokit.za Methodology: Isothermal Nucleic Acid Amplification         URINE CULTURE HOLD SAMPLE [[id number]] Collected: 11/27/22 1424    Order Status: Completed Specimen: Urine from Serum Updated: 11/27/22 1442     Urine culture hold       Urine on hold in Microbiology dept for 2 days. If unpreserved urine is submitted, it cannot be used for addtional testing after 24 hours, recollection will be required. I have reviewed notes of prior 24hr. Other pertinent lab: Total time spent with patient: 35 minutes I personally reviewed chart, notes, data and current medications in the medical record. I have personally examined and treated the patient at bedside during this period.                  Care Plan discussed with: Patient, Nursing Staff, and >50% of time spent in counseling and coordination of care    Discussed:  Care Plan    Prophylaxis:  SCD's    Disposition:  Home w/Family           ___________________________________________________    Attending Physician: Loralie Severs, MD

## 2022-11-30 VITALS
WEIGHT: 145 LBS | BODY MASS INDEX: 24.16 KG/M2 | DIASTOLIC BLOOD PRESSURE: 64 MMHG | HEART RATE: 99 BPM | SYSTOLIC BLOOD PRESSURE: 99 MMHG | OXYGEN SATURATION: 96 % | HEIGHT: 65 IN | RESPIRATION RATE: 16 BRPM | TEMPERATURE: 97.6 F

## 2022-11-30 PROCEDURE — 74011636637 HC RX REV CODE- 636/637: Performed by: INTERNAL MEDICINE

## 2022-11-30 PROCEDURE — G0378 HOSPITAL OBSERVATION PER HR: HCPCS

## 2022-11-30 RX ORDER — PREDNISONE 20 MG/1
20 TABLET ORAL
Qty: 90 TABLET | Refills: 0 | Status: SHIPPED | OUTPATIENT
Start: 2022-12-01 | End: 2023-03-01

## 2022-11-30 RX ORDER — PANTOPRAZOLE SODIUM 20 MG/1
20 TABLET, DELAYED RELEASE ORAL DAILY
Qty: 30 TABLET | Refills: 2 | Status: SHIPPED | OUTPATIENT
Start: 2022-11-30 | End: 2023-02-28

## 2022-11-30 RX ADMIN — PREDNISONE 20 MG: 20 TABLET ORAL at 07:56

## 2022-11-30 NOTE — PROGRESS NOTES
11/30/2022  Case Management Progress Note    10:14 AM  Patient is 40year old female admitted 11/27 with fever  Patient's RUR is 8% green/low risk for readmission  Covid test: negative 11/27  Chart reviewed  Per MD patient is ready for discharge today. She does not have any needs for a safe discharge at this time and plan remains for her to return home with her  and children. OK for discharge from CM standpoint.      Transition of Care Plan   Discharge today, order already in  Home with family assistance  Follow up outpatient as indicated  OK for discharge from CM standpoint  Family will transport     GABINO Tapia

## 2022-11-30 NOTE — PROGRESS NOTES
Bedside and Verbal shift change report given to Glendy Rinaldi RN (oncoming nurse) by [facility], RN (offgoing nurse). Report included the following information SBAR, Kardex, Intake/Output, MAR, and Recent Results.

## 2022-11-30 NOTE — PROGRESS NOTES
Bedside and Verbal shift change report given to JACK Champion (oncoming nurse) by Kory HARRINGTON RN (offgoing nurse). Report included the following information SBAR, Kardex, Intake/Output, MAR, and Recent Results.

## 2022-11-30 NOTE — PROGRESS NOTES
Gastrointestinal Progress Note    11/30/2022    Admit Date: 11/27/2022    Subjective:     States she feels better; no pain, vomiting. Bowel movement frequency, visible blood loss not much changed. Current Facility-Administered Medications   Medication Dose Route Frequency    predniSONE (DELTASONE) tablet 20 mg  20 mg Oral DAILY WITH BREAKFAST    sodium chloride (NS) flush 5-40 mL  5-40 mL IntraVENous Q8H    sodium chloride (NS) flush 5-40 mL  5-40 mL IntraVENous PRN    acetaminophen (TYLENOL) tablet 650 mg  650 mg Oral Q6H PRN    Or    acetaminophen (TYLENOL) suppository 650 mg  650 mg Rectal Q6H PRN    polyethylene glycol (MIRALAX) packet 17 g  17 g Oral DAILY PRN    ondansetron (ZOFRAN ODT) tablet 4 mg  4 mg Oral Q8H PRN    Or    ondansetron (ZOFRAN) injection 4 mg  4 mg IntraVENous Q6H PRN     Current Outpatient Medications   Medication Sig    [START ON 12/1/2022] predniSONE (DELTASONE) 20 mg tablet Take 1 Tablet by mouth daily (with breakfast) for 90 days. pantoprazole (PROTONIX) 20 mg tablet Take 1 Tablet by mouth daily for 90 days. oxyCODONE-acetaminophen (Percocet) 5-325 mg per tablet Take 1 Tablet by mouth every four (4) hours as needed for Pain. acetaminophen (TYLENOL) 500 mg tablet Take 500 mg by mouth every six (6) hours as needed for Pain. PNV Comb #2-Iron-FA-Omega 3 29-1-400 mg cmpk Take  by mouth. B.animalis,bifid,infantis,long (Probiotic 4X) 10-15 mg TbEC Take  by mouth.    calcium carbonate (TUMS) 200 mg calcium (500 mg) chew Take 2 Tablets by mouth daily. mupirocin (BACTROBAN) 2 % ointment Apply  to affected area daily. Objective:     Blood pressure 99/64, pulse 99, temperature 97.6 °F (36.4 °C), resp. rate 16, height 5' 5\" (1.651 m), weight 65.8 kg (145 lb), SpO2 96 %, currently breastfeeding.     11/30 0701 - 11/30 1900  In: 500 [P.O.:500]  Out: 0     11/28 1901 - 11/30 0700  In: 850 [P.O.:850]  Out: 0     EXAM:  GENERAL: alert, cooperative, no distress, HEART: regular rate and rhythm, LUNGS: chest clear, no wheezing, rales, normal symmetric air entry, Heart exam - S1, S2 normal, no murmur, no gallop, rate regular, ABDOMEN:  Bowel sounds are normal, liver is not enlarged, spleen is not enlarged and EXTREMITY: extremities normal, atraumatic, no cyanosis or edema, no edema      Data Review  No results found for this or any previous visit (from the past 24 hour(s)). Assessment:     Active Problems:    Fever (11/27/2022)    Inflammatory bowel disease    Plan:     1. She can be discharged home on 20 mg prednisone daily; she has my office phone number and understands to advise me if she is worsening in spite of daily use of this medication. Assuming she is stable she can then follow-up in the office to have additional medication adjustments depending upon her progress. 2.  Agree with discharge plans  3.   We will see again as an outpatient

## 2022-11-30 NOTE — PROGRESS NOTES
Discharge instructions/AVS discussed in detail with pt. Pt verbalizes understanding of all instructions, including where to get new medications. Pt denies any questions about instructions and has signed paper copy AVS. Pt discharged per MD order, being driven home by . Pt in no apparent distress at time of discharge with no complaints. No IV present at time of discharge.

## 2022-11-30 NOTE — DISCHARGE SUMMARY
Hospitalist Discharge Summary     Patient ID:  Esteban Khan  [id number]  40 y.o.  1985    Admit date: 11/27/2022    Discharge date and time: 11/30/2022    Admission Diagnoses: Fever [R50.9]    Discharge Diagnoses: Active Problems:    Fever (11/27/2022)           Hospital Course: The patient is a 41 y/o C F w/ PMH ulcerative colitis who comes in w/ recurrent fevers for the past two weeks. She recently had spontaneous vaginal delivery on Oct 28th. At that time she had an IV which infiltrated which was causing severe pain. On discharge she had increasing erythema, swelling and pain. This continued to persist and it has been treated w/ Keflex and Clindamycin. She was then treated w/ IV Dalvance on 11/21. Since that time she has had recurrent fevers. On ROS she also reports of substernal chest pain which is described as a sharp/stabbing intermittent pain. It only lasts for a few seconds and radiates to her stomach. She has no palpitations, coughing, wheezing or dyspnea. On ROS she continues to have bloody diarrhea w/ hx of UC and is not on medications and has not followed up w/ gastroenterology. She was admitted for further evaluation and treatment of the following:      Fever of unknown origin: on and off for few weeks. CTA of the chest is unremarkable for infection. UA is OK. Stool studies are negative. Covid and flu test negative. This was potentially related to ulcerative colitis. She has improved with steroids. 2.  Ulcerative Colitis/ bloody diarrhea: CT of the abdomen: Pancolonic wall thickening and inflammation suggestive of infectious versus inflammatory colitis. Improved with prednisone     3.  3 mm nodule superior segment right lower lobe. Out pt CT FU in a year.  Discussed with pt              PCP: Ankur Nicholson MD     Consults: GI    Condition of patient at discharge: good and improved    Discharge Exam:    Physical Exam:    Gen: Well-developed, well-nourished, in no acute distress  HEENT:  Pink conjunctivae, PERRL, hearing intact to voice, moist mucous membranes  Neck: Supple, without masses, thyroid non-tender  Resp: No accessory muscle use, clear breath sounds without wheezes rales or rhonchi  Card: No murmurs, normal S1, S2 without thrills, bruits or peripheral edema  Abd:  Soft, non-tender, non-distended, normoactive bowel sounds are present, no palpable organomegaly and no detectable hernias  Lymph:  No cervical or inguinal adenopathy  Musc: No cyanosis or clubbing  Skin: No rashes or ulcers, skin turgor is good  Neuro:  Cranial nerves are grossly intact, no focal motor weakness, follows commands appropriately  Psych:  Good insight, oriented to person, place and time, alert          Disposition: home    Patient Instructions:   Current Discharge Medication List        START taking these medications    Details   predniSONE (DELTASONE) 20 mg tablet Take 1 Tablet by mouth daily (with breakfast) for 90 days. Qty: 90 Tablet, Refills: 0      pantoprazole (PROTONIX) 20 mg tablet Take 1 Tablet by mouth daily for 90 days. Qty: 30 Tablet, Refills: 2           CONTINUE these medications which have NOT CHANGED    Details   oxyCODONE-acetaminophen (Percocet) 5-325 mg per tablet Take 1 Tablet by mouth every four (4) hours as needed for Pain. acetaminophen (TYLENOL) 500 mg tablet Take 500 mg by mouth every six (6) hours as needed for Pain. PNV Comb #2-Iron-FA-Omega 3 29-1-400 mg cmpk Take  by mouth. B.animalis,bifid,infantis,long (Probiotic 4X) 10-15 mg TbEC Take  by mouth.      calcium carbonate (TUMS) 200 mg calcium (500 mg) chew Take 2 Tablets by mouth daily. mupirocin (BACTROBAN) 2 % ointment Apply  to affected area daily.            STOP taking these medications       ibuprofen (MOTRIN) 800 mg tablet Comments:   Reason for Stopping:             Activity: Activity as tolerated  Diet: Regular Diet  Wound Care: None needed    Follow-up with Pamela Underwood MD in 1 week.  Follow-up tests/labs none    Approximate time spent in patient care on day of discharge: 20 min    Signed:   Erendira Mendez MD  11/30/2022  9:53 AM

## 2022-11-30 NOTE — DISCHARGE INSTRUCTIONS
HOSPITALIST DISCHARGE INSTRUCTIONS  NAME: Neva Finn   :  1985   MRN:  [id number]     Date/Time:  2022 9:52 AM    ADMIT DATE: 2022     DISCHARGE DATE: 2022     ADMITTING DIAGNOSIS:  Ulcerative Colitis    DISCHARGE DIAGNOSIS:  You were admitted for evaluation and treatment of the above. You will discharge on prednisone 20mg daily. I will also send in a prescription for pantoprazole, which is an acid reducer, to help reduce your risk of gastritis/ulcers, which can be a side effect of long term prednisone use. Please follow up with GI for ongoing care. MEDICATIONS:    It is important that you take the medication exactly as they are prescribed. Keep your medication in the bottles provided by the pharmacist and keep a list of the medication names, dosages, and times to be taken in your wallet. Do not take other medications without consulting your doctor. If you experience any of the following symptoms then please call your primary care physician or return to the emergency room if you cannot get hold of your doctor:  Fever, chills, nausea, vomiting, diarrhea, change in mentation, falling, bleeding, shortness of breath    Follow Up:  Please call and set up an appointment to see them in 1 week. Vipul Ureña MD  [address] [place] [address]  [phone]              Information obtained by :  I understand that if any problems occur once I am at home I am to contact my physician. I understand and acknowledge receipt of the instructions indicated above.                                                                                                                                            Physician's or R.N.'s Signature                                                                  Date/Time                                                                                                                                              Patient or Representative Signature                                                          Date/Time

## 2022-11-30 NOTE — PROGRESS NOTES
PCP   Moon Menezes MD [facility].  Practice prefers that patients contact office to schedule follow up appointments

## 2022-12-03 LAB
BACTERIA SPEC CULT: NORMAL
BACTERIA SPEC CULT: NORMAL
SERVICE CMNT-IMP: NORMAL
SERVICE CMNT-IMP: NORMAL

## 2022-12-15 ENCOUNTER — TELEPHONE (OUTPATIENT)
Dept: FAMILY MEDICINE CLINIC | Age: 37
End: 2022-12-15

## 2023-01-04 ENCOUNTER — OFFICE VISIT (OUTPATIENT)
Dept: INFECTIOUS DISEASES | Age: 38
End: 2023-01-04
Payer: COMMERCIAL

## 2023-01-04 VITALS
HEART RATE: 76 BPM | SYSTOLIC BLOOD PRESSURE: 104 MMHG | OXYGEN SATURATION: 99 % | BODY MASS INDEX: 23.49 KG/M2 | TEMPERATURE: 97.6 F | DIASTOLIC BLOOD PRESSURE: 62 MMHG | WEIGHT: 141 LBS | RESPIRATION RATE: 18 BRPM | HEIGHT: 65 IN

## 2023-01-04 DIAGNOSIS — R76.12 (QFT) QUANTIFERON-TB TEST REACTION WITHOUT ACTIVE TUBERCULOSIS: Primary | ICD-10-CM

## 2023-01-04 PROCEDURE — 99213 OFFICE O/P EST LOW 20 MIN: CPT | Performed by: INTERNAL MEDICINE

## 2023-01-04 NOTE — PROGRESS NOTES
[facility] Progress Note           Elijio Claude DO    [phone] Office  [phone]  Fax    1/4/2023      Assessment & Plan:     Indeterminate QuantiFERON gold x2. We will check tuberculin skin test.  Unfortunately this test is not available at this office at this time. Patient to check with her PCP and get back to me. If TST is positive patient will need 4 months rifampin. Ulcerative colitis. Patient on prednisone and Rinvoq (upadacitinib) she will need treatment for latent TB if TST is positive  RUE thrombophlebitis at previous IV site. Resolved. Status post spontaneous vaginal delivery 10/28/2022. We will need to check breast-feeding status if rifampin is started however rifampin is thought to be safe to use while breast-feeding           Subjective:     No complaints. Ulcerative colitis under much better control on prednisone and Rinvoq    Objective:     Vitals: Visit Vitals  /62   Pulse 76   Temp 97.6 °F (36.4 °C)   Resp 18   Ht 5' 5\" (1.651 m)   Wt 141 lb (64 kg)   SpO2 99%   Breastfeeding No   BMI 23.46 kg/m²        Exam:       Physical Examination:   General:  Alert, cooperative, no distress   Head:  Normocephalic, atraumatic. Eyes:  Conjunctivae clear   Neck: Supple       Lungs:   No distress. Chest wall:     Heart:     Abdomen:   non-distended   Extremities: Moves all. Skin: No acute rash on exposed skin   Neurologic: CNII-XII intact. Normal strength     Labs:        No lab exists for component: ITNL   No results for input(s): CPK, CKMB, TROIQ in the last 72 hours. No results for input(s): NA, K, CL, CO2, BUN, CREA, GLU, PHOS, MG, ALB, WBC, HGB, HCT, PLT, HGBEXT, HCTEXT, PLTEXT in the last 72 hours. No lab exists for component:  CA  No results for input(s): INR, PTP, APTT, INREXT in the last 72 hours.   Needs: urine analysis, urine sodium, protein and creatinine  No results found for: LUNA, CREAU      Cultures:     No results found for: SDES  Lab Results   Component Value Date/Time    Culture result: NO GROWTH 6 DAYS 11/27/2022 02:25 PM    Culture result: NO GROWTH 6 DAYS 11/27/2022 02:24 PM       Radiology:     Medications       Current Outpatient Medications   Medication Sig Dispense    predniSONE (DELTASONE) 20 mg tablet Take 1 Tablet by mouth daily (with breakfast) for 90 days. 90 Tablet    pantoprazole (PROTONIX) 20 mg tablet Take 1 Tablet by mouth daily for 90 days. 30 Tablet    PNV Comb #2-Iron-FA-Omega 3 29-1-400 mg cmpk Take  by mouth. B.animalis,bifid,infantis,long (Probiotic 4X) 10-15 mg TbEC Take  by mouth.     calcium carbonate (TUMS) 200 mg calcium (500 mg) chew Take 2 Tablets by mouth daily. oxyCODONE-acetaminophen (Percocet) 5-325 mg per tablet Take 1 Tablet by mouth every four (4) hours as needed for Pain. acetaminophen (TYLENOL) 500 mg tablet Take 500 mg by mouth every six (6) hours as needed for Pain. mupirocin (BACTROBAN) 2 % ointment Apply  to affected area daily. No current facility-administered medications for this visit.            Case discussed with:      Allan Farmer, DO

## 2023-02-23 NOTE — ED NOTES
Bedside shift change report given to [facility] (oncoming nurse) by Pamela Mejia RN (offgoing nurse). Report included the following information SBAR. Estimated Blood Loss (Cc): minimal

## 2023-05-25 RX ORDER — UREA 10 %
2 LOTION (ML) TOPICAL DAILY
COMMUNITY

## 2023-07-21 LAB
C. TRACHOMATIS, EXTERNAL RESULT: NEGATIVE
N. GONORRHOEAE, EXTERNAL RESULT: NEGATIVE

## 2023-08-15 LAB
ABO, EXTERNAL RESULT: NORMAL
HEP B, EXTERNAL RESULT: NEGATIVE
HEPATITIS C ANTIBODY, EXTERNAL RESULT: NEGATIVE
HIV, EXTERNAL RESULT: NEGATIVE
RH FACTOR, EXTERNAL RESULT: POSITIVE
RPR, EXTERNAL RESULT: NORMAL
RUBELLA TITER, EXTERNAL RESULT: NORMAL

## 2023-10-11 ENCOUNTER — ROUTINE PRENATAL (OUTPATIENT)
Age: 38
End: 2023-10-11

## 2023-10-11 VITALS — HEART RATE: 91 BPM | DIASTOLIC BLOOD PRESSURE: 60 MMHG | SYSTOLIC BLOOD PRESSURE: 95 MMHG

## 2023-10-11 DIAGNOSIS — Z87.19 H/O ULCERATIVE COLITIS: Primary | ICD-10-CM

## 2023-10-11 DIAGNOSIS — Z31.430 ENCOUNTER OF FEMALE FOR TESTING FOR GENETIC DISEASE CARRIER STATUS FOR PROCREATIVE MANAGEMENT: Primary | ICD-10-CM

## 2023-10-11 DIAGNOSIS — Z34.82 ENCOUNTER FOR SUPERVISION OF OTHER NORMAL PREGNANCY, SECOND TRIMESTER: ICD-10-CM

## 2023-10-11 DIAGNOSIS — Z3A.17 17 WEEKS GESTATION OF PREGNANCY: ICD-10-CM

## 2023-10-11 RX ORDER — MESALAMINE 1.2 G/1
2.4 TABLET, DELAYED RELEASE ORAL 2 TIMES DAILY
COMMUNITY
Start: 2023-09-15

## 2023-10-11 NOTE — PROCEDURES
PATIENT: Dougie Taylor   -  : 1985   -  DOS:10/11/2023   -  INTERPRETING PROVIDER:Danielle Stover,   Indication  ========    History of ulcerative colitis- present flair up    Method  ======    Transabdominal ultrasound examination. View: Suboptimal view: limited by early gestational age    Dating  ======    LMP on: 2023  Cycle: regular cycle  GA by LMP 17 w + 2 d  ETIENNE by LMP: 3/18/2024  Previous Ultrasound on: 2023  Type of prior assessment: GA  GA at prior assessment date 8 w + 2 d  GA by previous U/S 16 w + 2 d  ETIENNE by previous Ultrasound: 3/18/2024  Ultrasound examination on: 10/11/2023  GA by U/S based upon: AC, BPD, Femur, HC  GA by U/S 17 w + 6 d  ETIENNE by U/S: 3/14/2024  Assigned: based on the LMP, selected on 10/11/2023  Assigned GA 17 w + 2 d  Assigned ETIENNE: 3/18/2024    Fetal Growth Overview  =================    Exam date        GA              BPD (mm)          HC (mm)              AC (mm)              FL (mm)             HL (mm)             EFW (g)  10/11/2023        17w 2d        38.3     68%        144.6    61%        133.8     91%        23.8    40%        24.6     73%        220    85%    Fetal Biometry  ============    Standard  BPD 38.3 mm 17w 5d 68% Hadlock  OFD 50.4 mm 18w 3d 89% Jose  .6 mm 17w 5d 61% Hadlock  Cerebellum tr 18.9 mm 18w 3d 95% Hill  Nuchal fold 3.4 mm  .8 mm 18w 6d 91% Hadlock  Femur 23.8 mm 17w 1d 40% Hadlock  Humerus 24.6 mm 17w 5d 73% Jose   g 17w 6d 85% Hadlock  EFW (lb) 0 lb  EFW (oz) 8 oz  EFW by: Hadlock (BPD-HC-AC-FL)  Extended   5.3 mm  CM 4.4 mm  54% Nicolaides  Head / Face / Neck  Nasal bone: present  Other Structures   bpm    General Evaluation  ==============    Cardiac activity present.  bpm. Fetal movements: visualized. Presentation: Cephalic  Placenta: Placental site: posterior, no evidence of low lying  Umbilical cord: Cord vessels: 3 vessel cord.  Insertion site: normal insertion  Amniotic fluid: Amount of

## 2023-10-11 NOTE — PROGRESS NOTES
carrier screening looks at up to 421 different conditions (including everything on the Taoist panel), with a variety of other panel options in between depending on the patient's interest and concern for cost. At this time the patient ELECTED to proceed with expanded carrier screening for 421 conditions, which was ordered today through [place]. The patient reports  and Taoist ancestry and that the FOB reports  ancestry. The family history is otherwise unremarkable for intellectual disabilities, birth defects, multiple miscarriages, stillbirths, known genetic disorders, and consanguinity. Genetic Screening: The association between advancing maternal age and increasing risk of chromosomal aneuploidy was reviewed. Based on the patient's age of 41 at delivery, her initial risk to have a baby with Down syndrome was 1 in 1 and her initial risk to have a baby with any chromosomal aneuploidy was 1 in 80. Fortunately, the patient has had normal non-invasive prenatal testing (NIPT) that indicated a <1 in 10,000 risk for Down syndrome, trisomy 25, trisomy 15 and sex chromosomal aneuploidies in the current pregnancy. Results were consistent with a male fetus. Nevertheless, the benefits, risks and limitations of non-invasive prenatal testing (NIPT), ultrasonography and amniocentesis in the diagnosis of fetal aneuploidy were reviewed. Amniocentesis is typically performed between 12 and 20 weeks gestation, although it can often be performed earlier or later with reasonable safety, depending on the circumstances of the case. The risk for complication from amniocentesis is 1/400. The accuracy of amniocentesis is greater than 99% for chromosomal abnormalities such as aneuploidy, and approximately 98% for open neural tube defects when used in combination with detailed anatomic ultrasound.   The accuracy of other genetic testing ordered varies depending on the condition being tested for and the

## 2023-10-11 NOTE — PROGRESS NOTES
MATERNAL FETAL MEDICINE CONSULTATION    DATE OF CONSULT: 10/11/23    REASON FOR CONSULTATION: ulcerative colitis, advanced maternal age and medication exposure        HPI  Xenia Mahmood is a 41 y.o. female  at 17w2d is referred for the above indications. She was on Rinvoq early in pregnancy.   She has low risk NIPT    Patient Active Problem List   Diagnosis    Fever       Past Medical History:   Diagnosis Date    Asthma     Autoimmune disorder ([address])     Gastrointestinal disorder     Septic thrombophlebitis        Past Surgical History:   Procedure Laterality Date    HEENT         OB History    Para Term  AB Living   2 1 1     1   SAB IAB Ectopic Molar Multiple Live Births             1      # Outcome Date GA Lbr Osbaldo/2nd Weight Sex Delivery Anes PTL Lv   2 Current            1 Term     M Vag-Spont EPI  SHELDON       Allergies   Allergen Reactions    Shellfish Allergy Angioedema         Current Outpatient Medications:     mesalamine (LIALDA) 1.2 g EC tablet, Take 2 tablets by mouth 2 times daily, Disp: , Rfl:     Prenatal MV & Min w/FA-DHA ([facility] PRENATAL GUMMY PO), , Disp: , Rfl:     calcium carbonate (OS-NICK) 1250 (500 Ca) MG chewable tablet, Take 2 tablets by mouth daily, Disp: , Rfl:     Social History     Tobacco Use    Smoking status: Former    Smokeless tobacco: Never   Vaping Use    Vaping Use: Never used   Substance Use Topics    Alcohol use: Not Currently    Drug use: Never       Family History   Problem Relation Age of Onset    Diabetes type 2  Mother     Heart Surgery Father        BP 95/60 (Site: Left Upper Arm, Position: Sitting, Cuff Size: Medium Adult)   Pulse 91   LMP 2023      General: Comfortable, NAD  Constitution: Appears healthy    ULTRASOUND:  Ultrasound Result (most recent):  AMB MATERNAL FETAL MEDICINE ULTRASOUND (VIEWPOINT)     Narrative  PATIENTRaeann Hannah B   -  : 1985   -  DOS:10/11/2023   -  INTERPRETING PROVIDER:Jolanta

## 2023-11-08 ENCOUNTER — ROUTINE PRENATAL (OUTPATIENT)
Age: 38
End: 2023-11-08

## 2023-11-08 VITALS — SYSTOLIC BLOOD PRESSURE: 96 MMHG | HEART RATE: 82 BPM | DIASTOLIC BLOOD PRESSURE: 56 MMHG

## 2023-11-08 DIAGNOSIS — Z3A.21 21 WEEKS GESTATION OF PREGNANCY: Primary | ICD-10-CM

## 2023-11-08 NOTE — PROCEDURES
PATIENT: Stella Petersen   -  : 1985   -  DOS:2023   -  INTERPRETING PROVIDER:Jonathan Stover,   Indication  ========    History of ulcerative colitis    Method  ======    Transabdominal ultrasound examination. View: Good view    Pregnancy  =========    [place] pregnancy. Number of fetuses: 1    Dating  ======    LMP on: 2023  Cycle: regular cycle  GA by LMP 21 w + 2 d  ETIENNE by LMP: 3/18/2024  Previous Ultrasound on: 2023  Type of prior assessment: GA  GA at prior assessment date 8 w + 2 d  GA by previous U/S 21 w + 2 d  ETIENNE by previous Ultrasound: 3/18/2024  Ultrasound examination on: 2023  GA by U/S based upon: [facility], BPD, Femur, HC  GA by U/S 22 w + 2 d  ETIENNE by U/S: 3/11/2024  Assigned: based on the LMP, selected on 10/11/2023  Assigned GA 21 w + 2 d  Assigned ETIENNE: 3/18/2024    Fetal Biometry  ============    Standard  BPD 53.3 mm 22w 1d 82% Hadlock  OFD 70.2 mm 23w 3d 97% Jose  .2 mm 22w 0d 71% Hadlock  Cerebellum tr 22.7 mm 21w 1d 61% Hill  .1 mm 23w 4d 96% Hadlock  Femur 35.2 mm 21w 1d 35% Hadlock   g 22w 2d 93% Hadlock  EFW (lb) 1 lb  EFW (oz) 2 oz  EFW by: Hadlock (BPD-HC-AC-FL)  Extended   5.6 mm  CM 4.7 mm  30% Nicolaides  Other Structures   bpm    General Evaluation  ==============    Cardiac activity present.  bpm. Fetal movements: visualized. Presentation: Cephalic  Placenta: Placental site: posterior, no evidence of low lying  Umbilical cord: Cord vessels: 3 vessel cord.  Insertion site: normal insertion  Amniotic fluid: Amount of AF: normal. MVP 5.5 cm    Fetal Anatomy  ===========    Cranium: normal  Lateral ventricles: normal  Choroid plexus: normal  Midline falx: normal  Cavum septi pellucidi: normal  Cerebellum: normal  Cisterna magna: normal  Profile: normal  RVOT view: normal  LVOT view: normal  3-vessel view: normal  3-vessel-trachea view: normal  Heart / Thorax  4-chamber view: EIF x 2 left ventricle  Cord

## 2023-11-13 LAB
Lab: ABNORMAL
Lab: POSITIVE
NTRA PANEL NAME: ABNORMAL

## 2023-11-15 ENCOUNTER — TELEPHONE (OUTPATIENT)
Age: 38
End: 2023-11-15

## 2023-11-15 DIAGNOSIS — Z14.8 CARRIER OF GENETIC DISORDER: Primary | ICD-10-CM

## 2023-11-15 NOTE — TELEPHONE ENCOUNTER
Children with NPD type B often survive into adulthood. Refsum disease, PHYH-Related is an inherited disorder that causes absence of smell and retinitis pigmentosa (RP). Some people with this condition also have abnormalities of the bones in the hands and feet, muscle weakness, coordination and balance problems, hearing loss, ichthyosis and/or heart problems. Intellegence is not affected. Autosomal recessive inheritance as it applies to the conditions above were reviewed. If the father of the baby (FOB) is also found to be a carrier for any of these four conditions, the possible outcomes for the current pregnancy include: 1 in 4 (25%) chance of having an affected child, a 1 in 2 (50%) chance of having a child who is a carrier like themselves, and a 1 in 4 (25%) chance of having a child who is neither affected nor a carrier. 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 POLG-related disorders are a group of related inherited disorders that affect the muscles, nerves and brain. Alpers-Huttenlocher syndrome (AHS) is the most series of these disorders and causes severe encephalopathy along with chronic, poorly controlled seizures and progressive liver damage; neurodegenerative changes may occur. Ataxia Neuropathy Spectrum (ANS) causes movement and coordination problems along with seizures and neuropathy. Childhood Myocerebrohepatopathy Spectrum (MCHS) symptoms start within the first 3 years of life and include developmental delay, muscle weakness and poor growth. Myoclonic Epilepsy Myopathy Sensory Ataxia (MEMSA) causes seizures, muscle weakness and ataxia, usually starting in early adulthood. Progressive External Ophthalmoplegia (PEO), autosomal recessive, has symptoms that include weakness of the eye muscles that worsens with time with symptoms typically starting in early to mid adulthood.  PEO, autosomal dominant has symptoms that are similar to the recessive form, but also

## 2024-02-22 LAB — GBS, EXTERNAL RESULT: NEGATIVE

## 2024-03-15 ENCOUNTER — HOSPITAL ENCOUNTER (INPATIENT)
Facility: HOSPITAL | Age: 39
LOS: 1 days | Discharge: HOME | End: 2024-03-16
Attending: OBSTETRICS & GYNECOLOGY | Admitting: OBSTETRICS & GYNECOLOGY
Payer: COMMERCIAL

## 2024-03-15 PROBLEM — Z37.9 NORMAL LABOR: Status: ACTIVE | Noted: 2024-03-15

## 2024-03-15 LAB
ABO + RH BLD: NORMAL
BLOOD GROUP ANTIBODIES SERPL: NORMAL
ERYTHROCYTE [DISTWIDTH] IN BLOOD BY AUTOMATED COUNT: 17.3 % (ref 11.5–14.5)
HCT VFR BLD AUTO: 35.8 % (ref 35–47)
HGB BLD-MCNC: 12.2 G/DL (ref 11.5–16)
MCH RBC QN AUTO: 29.5 PG (ref 26–34)
MCHC RBC AUTO-ENTMCNC: 34.1 G/DL (ref 30–36.5)
MCV RBC AUTO: 86.5 FL (ref 80–99)
NRBC # BLD: 0 K/UL (ref 0–0.01)
NRBC BLD-RTO: 0 PER 100 WBC
PLATELET # BLD AUTO: 331 K/UL (ref 150–400)
PMV BLD AUTO: 9 FL (ref 8.9–12.9)
RBC # BLD AUTO: 4.14 M/UL (ref 3.8–5.2)
SPECIMEN EXP DATE BLD: NORMAL
WBC # BLD AUTO: 5.8 K/UL (ref 3.6–11)

## 2024-03-15 PROCEDURE — 7220000101 HC DELIVERY VAGINAL/SINGLE: Performed by: ADVANCED PRACTICE MIDWIFE

## 2024-03-15 PROCEDURE — 86850 RBC ANTIBODY SCREEN: CPT

## 2024-03-15 PROCEDURE — 2580000003 HC RX 258: Performed by: OBSTETRICS & GYNECOLOGY

## 2024-03-15 PROCEDURE — 6360000002 HC RX W HCPCS: Performed by: OBSTETRICS & GYNECOLOGY

## 2024-03-15 PROCEDURE — 6370000000 HC RX 637 (ALT 250 FOR IP): Performed by: OBSTETRICS & GYNECOLOGY

## 2024-03-15 PROCEDURE — 7210000100 HC LABOR FEE PER 1 HR: Performed by: ADVANCED PRACTICE MIDWIFE

## 2024-03-15 PROCEDURE — 6370000000 HC RX 637 (ALT 250 FOR IP): Performed by: ADVANCED PRACTICE MIDWIFE

## 2024-03-15 PROCEDURE — 4500000002 HC ER NO CHARGE

## 2024-03-15 PROCEDURE — 86900 BLOOD TYPING SEROLOGIC ABO: CPT

## 2024-03-15 PROCEDURE — 86901 BLOOD TYPING SEROLOGIC RH(D): CPT

## 2024-03-15 PROCEDURE — 36415 COLL VENOUS BLD VENIPUNCTURE: CPT

## 2024-03-15 PROCEDURE — 99212 OFFICE O/P EST SF 10 MIN: CPT

## 2024-03-15 PROCEDURE — 86780 TREPONEMA PALLIDUM: CPT

## 2024-03-15 PROCEDURE — 2500000003 HC RX 250 WO HCPCS: Performed by: OBSTETRICS & GYNECOLOGY

## 2024-03-15 PROCEDURE — 85027 COMPLETE CBC AUTOMATED: CPT

## 2024-03-15 PROCEDURE — 0HQ9XZZ REPAIR PERINEUM SKIN, EXTERNAL APPROACH: ICD-10-PCS | Performed by: STUDENT IN AN ORGANIZED HEALTH CARE EDUCATION/TRAINING PROGRAM

## 2024-03-15 PROCEDURE — 1120000000 HC RM PRIVATE OB

## 2024-03-15 RX ORDER — LIDOCAINE HYDROCHLORIDE 10 MG/ML
30 INJECTION, SOLUTION EPIDURAL; INFILTRATION; INTRACAUDAL; PERINEURAL PRN
Status: COMPLETED | OUTPATIENT
Start: 2024-03-15 | End: 2024-03-15

## 2024-03-15 RX ORDER — DOCUSATE SODIUM 100 MG/1
100 CAPSULE, LIQUID FILLED ORAL 2 TIMES DAILY
Status: DISCONTINUED | OUTPATIENT
Start: 2024-03-15 | End: 2024-03-15

## 2024-03-15 RX ORDER — SODIUM CHLORIDE 0.9 % (FLUSH) 0.9 %
5-40 SYRINGE (ML) INJECTION PRN
Status: DISCONTINUED | OUTPATIENT
Start: 2024-03-15 | End: 2024-03-16 | Stop reason: HOSPADM

## 2024-03-15 RX ORDER — OXYCODONE HYDROCHLORIDE 5 MG/1
5 TABLET ORAL EVERY 4 HOURS PRN
Status: DISCONTINUED | OUTPATIENT
Start: 2024-03-15 | End: 2024-03-16 | Stop reason: HOSPADM

## 2024-03-15 RX ORDER — IBUPROFEN 800 MG/1
800 TABLET ORAL EVERY 6 HOURS PRN
Status: DISCONTINUED | OUTPATIENT
Start: 2024-03-15 | End: 2024-03-15

## 2024-03-15 RX ORDER — IBUPROFEN 600 MG/1
600 TABLET ORAL EVERY 6 HOURS PRN
Status: DISCONTINUED | OUTPATIENT
Start: 2024-03-15 | End: 2024-03-16 | Stop reason: HOSPADM

## 2024-03-15 RX ORDER — SODIUM CHLORIDE 9 MG/ML
INJECTION, SOLUTION INTRAVENOUS PRN
Status: DISCONTINUED | OUTPATIENT
Start: 2024-03-15 | End: 2024-03-16 | Stop reason: HOSPADM

## 2024-03-15 RX ORDER — SODIUM CHLORIDE 9 MG/ML
25 INJECTION, SOLUTION INTRAVENOUS PRN
Status: DISCONTINUED | OUTPATIENT
Start: 2024-03-15 | End: 2024-03-15

## 2024-03-15 RX ORDER — CARBOPROST TROMETHAMINE 250 UG/ML
250 INJECTION, SOLUTION INTRAMUSCULAR PRN
Status: DISCONTINUED | OUTPATIENT
Start: 2024-03-15 | End: 2024-03-15

## 2024-03-15 RX ORDER — ONDANSETRON 2 MG/ML
4 INJECTION INTRAMUSCULAR; INTRAVENOUS EVERY 6 HOURS PRN
Status: DISCONTINUED | OUTPATIENT
Start: 2024-03-15 | End: 2024-03-15

## 2024-03-15 RX ORDER — FENTANYL CITRATE 50 UG/ML
25 INJECTION, SOLUTION INTRAMUSCULAR; INTRAVENOUS
Status: DISCONTINUED | OUTPATIENT
Start: 2024-03-15 | End: 2024-03-15

## 2024-03-15 RX ORDER — SODIUM CHLORIDE 0.9 % (FLUSH) 0.9 %
5-40 SYRINGE (ML) INJECTION EVERY 12 HOURS SCHEDULED
Status: DISCONTINUED | OUTPATIENT
Start: 2024-03-15 | End: 2024-03-15

## 2024-03-15 RX ORDER — SODIUM CHLORIDE 0.9 % (FLUSH) 0.9 %
5-40 SYRINGE (ML) INJECTION PRN
Status: DISCONTINUED | OUTPATIENT
Start: 2024-03-15 | End: 2024-03-15

## 2024-03-15 RX ORDER — SODIUM CHLORIDE, SODIUM LACTATE, POTASSIUM CHLORIDE, CALCIUM CHLORIDE 600; 310; 30; 20 MG/100ML; MG/100ML; MG/100ML; MG/100ML
INJECTION, SOLUTION INTRAVENOUS CONTINUOUS
Status: DISCONTINUED | OUTPATIENT
Start: 2024-03-15 | End: 2024-03-15

## 2024-03-15 RX ORDER — SODIUM CHLORIDE, SODIUM LACTATE, POTASSIUM CHLORIDE, AND CALCIUM CHLORIDE .6; .31; .03; .02 G/100ML; G/100ML; G/100ML; G/100ML
500 INJECTION, SOLUTION INTRAVENOUS PRN
Status: DISCONTINUED | OUTPATIENT
Start: 2024-03-15 | End: 2024-03-15

## 2024-03-15 RX ORDER — MISOPROSTOL 200 UG/1
400 TABLET ORAL PRN
Status: DISCONTINUED | OUTPATIENT
Start: 2024-03-15 | End: 2024-03-15

## 2024-03-15 RX ORDER — ONDANSETRON 4 MG/1
4 TABLET, ORALLY DISINTEGRATING ORAL EVERY 6 HOURS PRN
Status: DISCONTINUED | OUTPATIENT
Start: 2024-03-15 | End: 2024-03-15

## 2024-03-15 RX ORDER — LANOLIN/MINERAL OIL
LOTION (ML) TOPICAL PRN
Status: DISCONTINUED | OUTPATIENT
Start: 2024-03-15 | End: 2024-03-16 | Stop reason: HOSPADM

## 2024-03-15 RX ORDER — DOCUSATE SODIUM 100 MG/1
100 CAPSULE, LIQUID FILLED ORAL 2 TIMES DAILY
Status: DISCONTINUED | OUTPATIENT
Start: 2024-03-15 | End: 2024-03-16 | Stop reason: HOSPADM

## 2024-03-15 RX ORDER — SODIUM CHLORIDE, SODIUM LACTATE, POTASSIUM CHLORIDE, AND CALCIUM CHLORIDE .6; .31; .03; .02 G/100ML; G/100ML; G/100ML; G/100ML
1000 INJECTION, SOLUTION INTRAVENOUS PRN
Status: DISCONTINUED | OUTPATIENT
Start: 2024-03-15 | End: 2024-03-15

## 2024-03-15 RX ORDER — METHYLERGONOVINE MALEATE 0.2 MG/ML
200 INJECTION INTRAVENOUS PRN
Status: DISCONTINUED | OUTPATIENT
Start: 2024-03-15 | End: 2024-03-15

## 2024-03-15 RX ORDER — TERBUTALINE SULFATE 1 MG/ML
0.25 INJECTION, SOLUTION SUBCUTANEOUS
Status: DISCONTINUED | OUTPATIENT
Start: 2024-03-15 | End: 2024-03-15

## 2024-03-15 RX ORDER — SODIUM CHLORIDE 0.9 % (FLUSH) 0.9 %
5-40 SYRINGE (ML) INJECTION EVERY 12 HOURS SCHEDULED
Status: DISCONTINUED | OUTPATIENT
Start: 2024-03-15 | End: 2024-03-16 | Stop reason: HOSPADM

## 2024-03-15 RX ORDER — ACETAMINOPHEN 325 MG/1
650 TABLET ORAL EVERY 6 HOURS PRN
Status: DISCONTINUED | OUTPATIENT
Start: 2024-03-15 | End: 2024-03-16 | Stop reason: HOSPADM

## 2024-03-15 RX ORDER — TRANEXAMIC ACID 10 MG/ML
1000 INJECTION, SOLUTION INTRAVENOUS
Status: DISCONTINUED | OUTPATIENT
Start: 2024-03-15 | End: 2024-03-15

## 2024-03-15 RX ORDER — ACETAMINOPHEN 500 MG
1000 TABLET ORAL EVERY 4 HOURS PRN
Status: DISCONTINUED | OUTPATIENT
Start: 2024-03-15 | End: 2024-03-15

## 2024-03-15 RX ADMIN — LIDOCAINE HYDROCHLORIDE 30 ML: 10 INJECTION, SOLUTION EPIDURAL; INFILTRATION; INTRACAUDAL; PERINEURAL at 02:47

## 2024-03-15 RX ADMIN — IBUPROFEN 600 MG: 600 TABLET, FILM COATED ORAL at 09:12

## 2024-03-15 RX ADMIN — ACETAMINOPHEN 650 MG: 325 TABLET ORAL at 09:12

## 2024-03-15 RX ADMIN — ACETAMINOPHEN 650 MG: 325 TABLET ORAL at 21:38

## 2024-03-15 RX ADMIN — OXYCODONE 5 MG: 5 TABLET ORAL at 15:22

## 2024-03-15 RX ADMIN — IBUPROFEN 600 MG: 600 TABLET, FILM COATED ORAL at 15:21

## 2024-03-15 RX ADMIN — OXYCODONE 5 MG: 5 TABLET ORAL at 11:35

## 2024-03-15 RX ADMIN — OXYTOCIN 909.1 MILLI-UNITS/MIN: 10 INJECTION INTRAVENOUS at 02:29

## 2024-03-15 RX ADMIN — ACETAMINOPHEN 650 MG: 325 TABLET ORAL at 03:32

## 2024-03-15 RX ADMIN — IBUPROFEN 600 MG: 600 TABLET, FILM COATED ORAL at 03:32

## 2024-03-15 RX ADMIN — ACETAMINOPHEN 650 MG: 325 TABLET ORAL at 15:22

## 2024-03-15 RX ADMIN — IBUPROFEN 600 MG: 600 TABLET, FILM COATED ORAL at 21:38

## 2024-03-15 RX ADMIN — OXYCODONE 5 MG: 5 TABLET ORAL at 21:38

## 2024-03-15 ASSESSMENT — PAIN - FUNCTIONAL ASSESSMENT
PAIN_FUNCTIONAL_ASSESSMENT: ACTIVITIES ARE NOT PREVENTED

## 2024-03-15 ASSESSMENT — PAIN DESCRIPTION - DESCRIPTORS
DESCRIPTORS: CRAMPING
DESCRIPTORS: CRAMPING;ACHING
DESCRIPTORS: SORE

## 2024-03-15 ASSESSMENT — PAIN DESCRIPTION - ORIENTATION
ORIENTATION: LOWER

## 2024-03-15 ASSESSMENT — PAIN DESCRIPTION - LOCATION
LOCATION: ABDOMEN
LOCATION: PERINEUM
LOCATION: ABDOMEN
LOCATION: ABDOMEN;BACK
LOCATION: ABDOMEN

## 2024-03-15 ASSESSMENT — PAIN SCALES - GENERAL
PAINLEVEL_OUTOF10: 6
PAINLEVEL_OUTOF10: 7
PAINLEVEL_OUTOF10: 6
PAINLEVEL_OUTOF10: 8
PAINLEVEL_OUTOF10: 7

## 2024-03-15 NOTE — H&P
History & Physical    Name: Juhi Grajeda MRN: [id number]  SSN: [id number]    YOB: 1985  Age: 38 y.o.  Sex: female        Subjective:     Estimated Date of Delivery: 3/18/24  OB History          2    Para   1    Term   1            AB        Living   1         SAB        IAB        Ectopic        Molar        Multiple        Live Births   1                Ms. Grajeda is admitted with pregnancy at 39w4d in active labor. Prenatal course was normal. Please see prenatal records for details.    Past Medical History:   Diagnosis Date    Anemia     in pregnancy (on iron)    Asthma     childhood    Autoimmune disorder (HCC)     Gastrointestinal disorder     IBS    Septic thrombophlebitis     Ulcerative (chronic) enterocolitis (HCC)     on mesalamine and hydrocortisone supp     Past Surgical History:   Procedure Laterality Date    HEENT       Social History     Occupational History    Not on file   Tobacco Use    Smoking status: Former    Smokeless tobacco: Never   Vaping Use    Vaping Use: Never used   Substance and Sexual Activity    Alcohol use: Not Currently    Drug use: Never    Sexual activity: Yes     Partners: Male     Family History   Problem Relation Age of Onset    Diabetes type 2  Mother     Heart Surgery Father        Allergies   Allergen Reactions    Shellfish Allergy Angioedema     Prior to Admission medications    Medication Sig Start Date End Date Taking? Authorizing Provider   Ferrous Sulfate (IRON PO) Take by mouth    ProviderVane MD   mesalamine (LIALDA) 1.2 g EC tablet Take 2 tablets by mouth 2 times daily 9/15/23   ProviderVane MD   Prenatal MV & Min w/FA-DHA ([facility] PR[facility]ATAL GUM[facility] PO)  7/15/23   Vane Lindsey MD   calcium carbonate (OS-NICK) 1250 (500 Ca) MG chewable tablet Take 2 tablets by mouth daily    Automatic Reconciliation, Ar        Review of Systems: Pertinent items are noted in HPI.    Objective:     Vitals:  Vitals:    03/15/24 0217   BP:  131/61   Pulse: (!) 109   Resp: 26   Temp: 98.1 °F (36.7 °C)   TempSrc: Oral   SpO2: 97%        Physical Exam:  Patient with distress  Heart: Regular rate and rhythm  Lung: no wheezes, no rales, no rhonchi and normal respiratory effort  Back costovertebral angle tenderness: absent  Abdomen: soft, nontender  Fundus: firm and non tender  Perineum: blood absent, amniotic fluid absent  Cervical Exam: 8 cm dilated    100% effaced    0 station    Presenting Part: cephalic  Lower Extremities:  - Edema No  Membranes:   bulging bag  Fetal Heart Rate: Reactive  Contractions: every 2 min   Prenatal Labs:   A positive  GBS neg    Assessment/Plan:   39yo  term labor  Category 1 strip     Plan:   Admit for labor   Anticipate   Signed By:  ELEAN Lyle CNM     March 15, 2024

## 2024-03-15 NOTE — LACTATION NOTE
This note was copied from a baby's chart.  Mother states baby has been nursing well.  Mothers 2nd baby to BF.  BF basics reviewed.  Mother taking Mesalamine for Ulcerative Colitis.  Medication print out given. Medication is a L3, pediatric concerns of blood in stool and diarrhea.   Phone number given for infant risk center for parents to call.  Mother planned to BF for 2 weeks then switch to formula due to her needing to restart another medication that is not recommended with breastfeeding.  Mother is not sure at this point how she plans to move forward with feedings. Mother may switch to formula now or blend feed.    Discussed with mother her plan for feeding.  Reviewed the benefits of exclusive breast milk feeding during the hospital stay.  She acknowledges understanding of information reviewed and states that it is her plan is undecided at this time.  Will support her choice and offer additional information as needed.     Reviewed breastfeeding basics:  How milk is made and normal  breastfeeding behaviors discussed.  Supply and demand,  stomach size, early feeding cues, skin to skin bonding with comfortable positioning and baby led latch-on reviewed.  How to identify signs of successful breastfeeding sessions reviewed; education on asymetrical latch, signs of effective latching vs shallow, in-effective latching, normal  feeding frequency and duration and expected infant output discussed.  Normal course of breastfeeding discussed including the AAP's recommendation that children receive exclusive breast milk feedings for the first six months of life with breast milk feedings to continue through the first year of life and/or beyond as complimentary table foods are added.  Breastfeeding Booklet and Warm line information provided with discussion.  Discussed typical  weight loss and the importance of pediatrician appointment within 24-48 hours of discharge, at 2 weeks of life and normalcy of  requesting pediatric weight checks as needed in between visits.       Pt will successfully establish breastfeeding by feeding in response to early feeding cues   or wake every 3h, will obtain deep latch, and will keep log of feedings/output.  Taught to BF at hunger cues and or q 2-3 hrs and to offer 10-20 drops of hand expressed colostrum at any non-feeds.      Left Breast: Soft  Left Nipple: Protrude  Right Nipple: Protrude  Right Breast: Soft  Position and Latch: Independently  Signs of Transfer: Nutritive sucking  Maternal Response: Relaxed and confident, Attentive           Latch: Grasps breast, tongue down, lips flanged, rhythmic sucking  Audible Swallowing: A few with stimulation  Type of Nipple: Everted (after stimulation)  Comfort (Breast/Nipple): Soft/non-tender  Hold (Positioning): Full assist, teach one side, mother does other, staff holds  LATCH Score: 8

## 2024-03-15 NOTE — PROGRESS NOTES
0135: Assumed care of pt. Endorses extremely painful contractions, feeling vaginal pressure with contractions, and positive fetal mov't. Does not endorse the need to push at this time. Denies LOF and/or vaginal bleeding.  at bedside.    0525: TRANSFER - OUT REPORT:    Verbal report given to Ekta VILLARREAL on Juhi Grajeda  being transferred to [facility] for routine progression of patient care       Report consisted of patient's Situation, Background, Assessment and   Recommendations(SBAR).     Information from the following report(s) Nurse Handoff Report, Index, Adult Overview, Intake/Output, MAR, Recent Results, and Quality Measures was reviewed with the receiving nurse.           Lines:   Peripheral IV 03/15/24 Left;Posterior Forearm (Active)   Site Assessment Clean, dry & intact 03/15/24 0155   Line Status Blood return noted;Brisk blood return;Flushed 03/15/24 0155   Line Care Connections checked and tightened 03/15/24 0155   Phlebitis Assessment No symptoms 03/15/24 0155   Infiltration Assessment 0 03/15/24 0155   Alcohol Cap Used No 03/15/24 0155   Dressing Status Clean, dry & intact;New dressing applied 03/15/24 0155   Dressing Type Transparent 03/15/24 0155   Dressing Intervention New 03/15/24 0155        Opportunity for questions and clarification was provided.      Patient transported with:  Registered Nurse

## 2024-03-15 NOTE — PROGRESS NOTES
PostPartum Note    Juhi Grajeda  [id number]  1985  38 y.o.    S:  Ms. Juhi Grajeda is a 38 y.o.  PPD #0 s/p  @ 39w4d.    Patient sleeping, did not bother    O:   /67   Pulse 91   Temp 97.9 °F (36.6 °C) (Oral)   Resp 16   Wt 72.6 kg (160 lb)   LMP 2023   SpO2 99%   Breastfeeding Unknown   BMI 26.63 kg/m²       Lab Results   Component Value Date/Time    WBC 5.8 03/15/2024 02:03 AM    HGB 12.2 03/15/2024 02:03 AM    HCT 35.8 03/15/2024 02:03 AM     03/15/2024 02:03 AM    MCV 86.5 03/15/2024 02:03 AM         A/P:  38 y.o.  PPD #0 s/p  @ 39w4d doing well.    1.  Routine PP care  2.  Blood type - Rh pos  3.  Rubella immune  4.  Discharge PPD 1 or 2   5.  F/U 2 weeks for PP check.      Viviana Cardenas MD  [facility]

## 2024-03-15 NOTE — L&D DELIVERY NOTE
Patient shortly progressed to 10/100/+1 after arrival. SROM thin mec fluid. Patient in semi-fowlers. After approximately pushing for 15 min, head delivered OA with compound presentation. No nuchal cord. With next push shoulders easily delivered and baby brought to her chest. Baby vigorous and crying with drying/stimulation. 3vc clamped and cut when pulsing ceased. Active management of third stage of labor. Placenta delivered spontaneously and intact.  1st degree repaired for hemostasis with 3-0 vicryl CT under local lidocaine . Fundus firm, midline, small bleeding. Baby remains skin to skin with mom. QBL:  150ml       Kuldip Grajeda [[id number]]      Labor Events     Labor: No   Steroids: None  Cervical Ripening Date/Time:      Antibiotics Received during Labor: No  Rupture Identifier: Sac 1  Rupture Date/Time:  3/15/24 02:05:00   Rupture Type: SROM  Fluid Color: Meconium  Meconium Consistency: Thin  Fluid Odor: None  Fluid Volume: Moderate  Induction: None  Augmentation: None  Labor Complications: Precipitous Labor              Anesthesia    Method: Local       Labor Event Times      Labor onset date/time:  3/15/24 00:15:00     Dilation complete date/time:  3/15/24 02:05:00     Start pushing date/time:  3/15/2024 02:05:00   Decision date/time (emergent ):            Delivery Details      Delivery Date: 3/15/24 Delivery Time: 02:26:41   Delivery Type: Vaginal, Spontaneous               Presentation    Presentation: Compound  Position: Right  _: Occiput  _: Anterior       Shoulder Dystocia    Shoulder Dystocia Present?: No       Assisted Delivery Details    Forceps Attempted?: No  Vacuum Extractor Attempted?: No                           Cord    Vessels: 3 Vessels  Complications: None  Delayed Cord Clamping?: Yes  Cord Clamped Date/Time: 3/15/2024 02:28:49  Cord Blood Disposition: Discard  Gases Sent?: No              Placenta    Date/Time: 3/15/2024 02:32:08  Removal:

## 2024-03-16 VITALS
TEMPERATURE: 98.4 F | SYSTOLIC BLOOD PRESSURE: 89 MMHG | BODY MASS INDEX: 26.63 KG/M2 | DIASTOLIC BLOOD PRESSURE: 61 MMHG | OXYGEN SATURATION: 97 % | RESPIRATION RATE: 16 BRPM | WEIGHT: 160 LBS | HEART RATE: 88 BPM

## 2024-03-16 PROCEDURE — 6370000000 HC RX 637 (ALT 250 FOR IP): Performed by: ADVANCED PRACTICE MIDWIFE

## 2024-03-16 PROCEDURE — 6370000000 HC RX 637 (ALT 250 FOR IP): Performed by: OBSTETRICS & GYNECOLOGY

## 2024-03-16 RX ORDER — IBUPROFEN 600 MG/1
600 TABLET ORAL EVERY 8 HOURS PRN
Qty: 40 TABLET | Refills: 1 | Status: SHIPPED | OUTPATIENT
Start: 2024-03-16

## 2024-03-16 RX ADMIN — ACETAMINOPHEN 650 MG: 325 TABLET ORAL at 03:15

## 2024-03-16 RX ADMIN — OXYCODONE 5 MG: 5 TABLET ORAL at 03:00

## 2024-03-16 RX ADMIN — IBUPROFEN 600 MG: 600 TABLET, FILM COATED ORAL at 03:15

## 2024-03-16 RX ADMIN — OXYCODONE 5 MG: 5 TABLET ORAL at 13:46

## 2024-03-16 RX ADMIN — ACETAMINOPHEN 650 MG: 325 TABLET ORAL at 11:30

## 2024-03-16 RX ADMIN — IBUPROFEN 600 MG: 600 TABLET, FILM COATED ORAL at 11:29

## 2024-03-16 RX ADMIN — OXYCODONE 5 MG: 5 TABLET ORAL at 07:53

## 2024-03-16 ASSESSMENT — PAIN SCALES - GENERAL
PAINLEVEL_OUTOF10: 6
PAINLEVEL_OUTOF10: 8
PAINLEVEL_OUTOF10: 8
PAINLEVEL_OUTOF10: 5
PAINLEVEL_OUTOF10: 4

## 2024-03-16 ASSESSMENT — PAIN DESCRIPTION - LOCATION
LOCATION: ABDOMEN;BACK
LOCATION: ABDOMEN
LOCATION: ABDOMEN;BACK
LOCATION: ABDOMEN
LOCATION: ABDOMEN;BACK

## 2024-03-16 ASSESSMENT — PAIN DESCRIPTION - ORIENTATION
ORIENTATION: LOWER

## 2024-03-16 ASSESSMENT — PAIN DESCRIPTION - DESCRIPTORS
DESCRIPTORS: CRAMPING
DESCRIPTORS: ACHING
DESCRIPTORS: CRAMPING

## 2024-03-16 ASSESSMENT — PAIN - FUNCTIONAL ASSESSMENT: PAIN_FUNCTIONAL_ASSESSMENT: ACTIVITIES ARE NOT PREVENTED

## 2024-03-16 NOTE — PROGRESS NOTES
Patient discharged to home, education complete. Patient stated understanding with all questions answered.  Prescriptions: Motrin

## 2024-03-16 NOTE — DISCHARGE INSTRUCTIONS
Discharge Instructions for Vaginal Delivery    Patient ID:  Juhi Grajeda  [id number]  38 y.o.  1985    Take Home Medications       Continue taking your prenatal vitamins if you are breastfeeding.    Follow-up care is a key part of your treatment and safety. Please schedule and keep appointments.  Follow-up with your primary OB in 6 weeks.    Activity  Avoid anything in your vagina for 6 weeks (no intercourse, tampons, or douching).  You may drive unless you are taking prescription pain medications.  Climbing stairs and light lifting are okay.  Please avoid excessive exercise, though walking is okay- you'll be tired!    Diet  Regular diet as tolerated.  Be sure to drink plenty of fluids if you are breastfeeding.    Wound care  If you have stitches, continue to rinse with a squirt bottle of warm water each time you void for about 7-10 days..  Your stitches will gradually dissolve over four to eight weeks.  Sitz baths are also helpful to keep the wound clean, encourage healing, and to help with pain associated with the stitches or hemorrhoids.  You can use either a sitz bath basin or a bathtub filled with 2-3\" inches of plain warm water.  Soak for 10 minutes 3 times a day as tolerated.    Pain Management  Over the counter medications such as Tylenol and ibuprofen (Motrin or Advil) are ideal.  These may be taken together, alternating doses.  You may  take the maximum dose:  Motrin or Advil (generic ibuprofen), either 3 tablets every 6 hours or 4 tablets every 8 hours or Tylenol (acetominophen) 1000mg every 6 hours (equivalent to 2 extra strength Tylenol).  You may also have a precrescription for stronger pain medication.  Take only as needed and transition to over the counter medication in the next few days. Minimize amounts of the prescription medication, as it can be habit-forming and will worsen or cause constipation. Most patients will find that within a couple of days, their pain is adequately controlled using  pain that does not get better after you take pain medicine.  Sudden chest pain and shortness of breath  Signs of a blood clot: pain/ swelling/ increasing redness in your lower extremeties  Signs of infection: increased pain in your abdomen or vaginal area; red streaks, warmth, or tenderness of your breasts; fever of 100.5 F or greater

## 2024-03-16 NOTE — DISCHARGE SUMMARY
Obstetrical Discharge Summary     Name: Juhi Grajeda MRN: [id number]  SSN: [id number]    YOB: 1985  Age: 38 y.o.  Sex: female      Allergies: Shellfish allergy    Admit Date: 3/15/2024    Discharge Date: 3/16/24      Admitting Physician: Caryn Scott MD     Attending Physician:  Viviana Cardenas MD     * Admission Diagnoses: Normal labor [O80, Z37.9]    * Discharge Diagnoses:   Information for the patient's :  Kuldip Grajeda [[id number]]   @[id number]@      Additional Diagnoses:    Lab Results   Component Value Date/Time    ABORH A POSITIVE 03/15/2024 02:03 AM    There is no immunization history for the selected administration types on file for this patient.    * Procedures:   * No surgery found *           * Discharge Condition: good    * Hospital Course: Normal hospital course following the delivery.    * Disposition: Home    Discharge Medications:      Medication List        ASK your doctor about these medications      calcium carbonate 1250 (500 Ca) MG chewable tablet  Commonly known as: OS-NICK     [facility] PR[facility]ATAL GUMMY PO     IRON PO     mesalamine 1.2 g EC tablet  Commonly known as: LIALDA              * Follow-up Care/Patient Instructions:  Activity: Activity as tolerated  Diet: Regular Diet  Wound Care: As directed    [unfilled]

## 2024-03-16 NOTE — LACTATION NOTE
This note was copied from a baby's chart.  Mother states nursing has been going well, but she also plans to blend feed.  Formula recently fed to infant.  Reviewed paced feeds and smaller infant stomach sizes.  Slow flow nipples to bedside.  Family aware to call out for any lactation needs.     Pt chooses to do both breast and bottle.  Discussed effects of early supplementation on breastfeeding success; may decrease breastmilk production and supply, increase risk for pathological engorgement, baby may develop preference for faster flow from bottles vs breast, and baby's stomach can be stretched if larger volumes of formula are given.Pt taught that Paced Bottle Feeding is a method of bottle feeding that allows the infant to be more in control of the feeding pace. This method slows the flow of milk into the nipple and mouth, allowing the baby to eat more slowly, and take breaks. Paced feeding reduces the risk of overfeeding that may result in discomfort to the baby. This feeding method is recommended for any baby that receives bottles, whether fully bottle fed, or fed from the breast and a bottle.      Pt taught to use slow flow nipple, hold baby in a semi-upright position, supporting the head and neck.  When baby shows hunger cues, tickle baby’s lip so he opens his mouth wide.  Insert nipple into baby’s mouth, making sure the baby has a deep latch.  Hold the bottle flat, (horizontal to the floor). Let the baby begin sucking on the nipple without milk, then tip the bottle just enough to fill the nipple about long-term with milk. Let baby suck for a few continuous swallows--20-30 seconds. After a few swallows, tip the bottle back down, giving baby a little break. After a few seconds, when the baby begins to suck again, tip bottle up to allow milk to flow into the nipple.  Continue this Paced Feeding until baby shows fullness signs - no longer sucking after the break, turning away or pushing away from the nipple.   This  method can help to facilitate between breast and bottle.

## 2024-03-16 NOTE — PROGRESS NOTES
Post-Partum Day Number 1 Progress Note    Patient doing well post-partum without significant complaints.  Voiding without difficulty, normal lochia.    Vitals:  Patient Vitals for the past 24 hrs:   BP Temp Temp src Pulse Resp SpO2   24 0806 (!) 89/61 98.4 °F (36.9 °C) Oral 88 16 97 %   03/15/24 1948 (!) 92/55 97.7 °F (36.5 °C) Oral 90 18 99 %   03/15/24 1511 (!) 93/56 98.1 °F (36.7 °C) Oral 98 15 97 %     Temp (24hrs), Av.1 °F (36.7 °C), Min:97.7 °F (36.5 °C), Max:98.4 °F (36.9 °C)      Vital signs stable, afebrile.    Exam:  Patient without distress.               Abdomen soft, fundus firm, nontender               Lower extremities, BRITTANY nontender w/o edema    Labs: No results found for this or any previous visit (from the past 24 hour(s)).    Assessment and Plan:  PPD 1 s/p    VMI / Rh pos / Rubella immune / circ done  Routine postpartum/postop care.  Pt requests early discharge today-instructions reviewed, follow up in office in 4-6 weeks.    Radha Duncan DO, FACOG  [facility]

## 2024-03-17 LAB — T PALLIDUM AB SER QL IA: NON REACTIVE

## 2024-04-11 ENCOUNTER — TELEPHONE (OUTPATIENT)
Age: 39
End: 2024-04-11

## 2024-04-11 NOTE — TELEPHONE ENCOUNTER
The patient, Juhi Grajeda, called in today with questions regarding billing for genetic testing performed last October through [facility]. We reviewed that her partner's carrier screening should be free as it was a reflex test after her positive result. I have reached out to Vianca Harley, our rep at the lab, to have her look into it. Instructed the patient to call me back on Monday if she hadn't heard from Vianca by then.    The patient verbalized her understanding of the information as it was presented to her today; she denies any further questions or concerns at this time.    Desiree Titus MS, [facility]  Licensed, Certified Genetic Counselor

## 2024-05-03 ENCOUNTER — TELEPHONE (OUTPATIENT)
Age: 39
End: 2024-05-03

## 2024-05-14 ENCOUNTER — TELEPHONE (OUTPATIENT)
Age: 39
End: 2024-05-14

## 2024-05-14 NOTE — TELEPHONE ENCOUNTER
I attempted to call the patient, Juhi Grajeda, back after she called the office earlier today. The patient did not answer. I lvm informing patient that I am waiting for an update from Vianca regarding the patient's billing questions, but will update her again as soon as I have something. Encouraged the patient to call back with any further questions.    Desiree Titus MS, [facility]  Licensed, Certified Genetic Counselor

## 2025-04-29 ENCOUNTER — TRANSCRIBE ORDERS (OUTPATIENT)
Facility: HOSPITAL | Age: 40
End: 2025-04-29

## 2025-04-29 DIAGNOSIS — R91.1 PULMONARY NODULE: Primary | ICD-10-CM

## 2025-04-29 DIAGNOSIS — R91.1 PULMONARY NODULE, RIGHT: ICD-10-CM

## 2025-05-02 ENCOUNTER — HOSPITAL ENCOUNTER (OUTPATIENT)
Facility: HOSPITAL | Age: 40
Discharge: HOME | End: 2025-05-02
Payer: COMMERCIAL

## 2025-05-02 DIAGNOSIS — R91.1 PULMONARY NODULE, RIGHT: ICD-10-CM

## 2025-05-02 PROCEDURE — 71250 CT THORAX DX C-: CPT

## 2025-06-03 ENCOUNTER — TRANSCRIBE ORDERS (OUTPATIENT)
Facility: HOSPITAL | Age: 40
End: 2025-06-03

## 2025-06-03 DIAGNOSIS — K92.1 HEMATOCHEZIA: Primary | ICD-10-CM

## 2025-06-03 DIAGNOSIS — K51.011 ULCERATIVE (CHRONIC) PANCOLITIS WITH RECTAL BLEEDING (HCC): ICD-10-CM

## 2025-06-30 NOTE — PRE-PROCEDURE INSTRUCTIONS
6/30/2025  4:03 p.m.  Spoke with patient and confirmed arrival time of 3:45 p.m. for appointment scheduled on 7/2/2025; instructions given.

## 2025-07-02 ENCOUNTER — HOSPITAL ENCOUNTER (OUTPATIENT)
Facility: HOSPITAL | Age: 40
Discharge: HOME | End: 2025-07-05
Payer: COMMERCIAL

## 2025-07-02 DIAGNOSIS — K92.1 HEMATOCHEZIA: ICD-10-CM

## 2025-07-02 DIAGNOSIS — K51.011 ULCERATIVE (CHRONIC) PANCOLITIS WITH RECTAL BLEEDING (HCC): ICD-10-CM

## 2025-07-02 PROCEDURE — 77080 DXA BONE DENSITY AXIAL: CPT
